# Patient Record
Sex: MALE | Race: WHITE | Employment: PART TIME | URBAN - METROPOLITAN AREA
[De-identification: names, ages, dates, MRNs, and addresses within clinical notes are randomized per-mention and may not be internally consistent; named-entity substitution may affect disease eponyms.]

---

## 2017-01-05 DIAGNOSIS — F90.2 ATTENTION DEFICIT HYPERACTIVITY DISORDER (ADHD), COMBINED TYPE: Primary | ICD-10-CM

## 2017-02-24 DIAGNOSIS — F90.2 ATTENTION DEFICIT HYPERACTIVITY DISORDER (ADHD), COMBINED TYPE: ICD-10-CM

## 2017-02-24 RX ORDER — DEXTROAMPHETAMINE SACCHARATE, AMPHETAMINE ASPARTATE MONOHYDRATE, DEXTROAMPHETAMINE SULFATE AND AMPHETAMINE SULFATE 7.5; 7.5; 7.5; 7.5 MG/1; MG/1; MG/1; MG/1
30 CAPSULE, EXTENDED RELEASE ORAL DAILY
Qty: 30 CAPSULE | Refills: 0 | Status: SHIPPED | OUTPATIENT
Start: 2017-02-24 | End: 2017-04-06

## 2017-02-24 RX ORDER — DEXTROAMPHETAMINE SULFATE 10 MG/1
10 TABLET ORAL DAILY
Qty: 30 TABLET | Refills: 0 | Status: SHIPPED | OUTPATIENT
Start: 2017-02-24 | End: 2017-04-06

## 2017-02-24 NOTE — TELEPHONE ENCOUNTER
amphetamine-dextroamphetamine (ADDERALL XR) 30 MG per 24 hr capsule      Last Written Prescription Date:  2/16/17   Last Fill Quantity: 30,  # refills: 0   Last Office Visit with [facility] or [facility] prescribing provider: 12/16/16                                     dextroamphetamine (DEXTROSTAT) 10 MG tablet      Last Written Prescription Date 02/16/17  Last Fill Quantity: 30,  # refills: 0   Last Office Visit with [facility], [facility] or [facility] prescribing provider: 12/16/16                                           MOM HAS APPOINTMENT AT 1 WITH YOU.  SHE WILL  THEN.

## 2017-02-24 NOTE — TELEPHONE ENCOUNTER
Will give mom Teressa Rx at her appt today, will have her sign to verify she picked up Rx.Bel Hernandez CMA

## 2017-04-06 DIAGNOSIS — F90.2 ATTENTION DEFICIT HYPERACTIVITY DISORDER (ADHD), COMBINED TYPE: ICD-10-CM

## 2017-04-06 RX ORDER — DEXTROAMPHETAMINE SULFATE 10 MG/1
10 TABLET ORAL DAILY
Qty: 30 TABLET | Refills: 0 | Status: SHIPPED | OUTPATIENT
Start: 2017-04-06 | End: 2017-05-02

## 2017-04-06 RX ORDER — DEXTROAMPHETAMINE SACCHARATE, AMPHETAMINE ASPARTATE MONOHYDRATE, DEXTROAMPHETAMINE SULFATE AND AMPHETAMINE SULFATE 7.5; 7.5; 7.5; 7.5 MG/1; MG/1; MG/1; MG/1
30 CAPSULE, EXTENDED RELEASE ORAL DAILY
Qty: 30 CAPSULE | Refills: 0 | Status: SHIPPED | OUTPATIENT
Start: 2017-04-06 | End: 2017-05-02

## 2017-04-06 NOTE — TELEPHONE ENCOUNTER
Adderall 10 mg  Please call patient at   [phone].  OK TO LM    Last Written Prescription Date:  02/24/17  Last Fill Quantity: 30,   # refills: 0  Last Office Visit with [facility] prescribing provider: 12/16/16  Future Office visit:    Next 5 appointments (look out 90 days)     Apr 12, 2017  3:20 PM CDT   SHORT with Christiana Harden MD   [facility]    [address] [place] [id number] [phone]                   Routing refill request to provider for review/approval because:  Drug not on the [facility], [facility] or [facility] refill protocol or controlled substance  [place] reviewed:  reviewed- no concerns    [place]:  [email or web address]  BACILIO Gleason RN

## 2017-04-07 NOTE — TELEPHONE ENCOUNTER
Patient called. He went to fill script and pharmacy told him ins is requiring prior auth.  Pharmacy is going to fax info today but patient wanted to give us a heads up that it was coming and if we can pls start PA today he would appreciate it.

## 2017-04-11 NOTE — TELEPHONE ENCOUNTER
PA approved and called pt and left msg to inform pt but unable to due to vmail not taking msg.  Notified pharmacy.Bel Hernandez, CMA

## 2017-05-02 DIAGNOSIS — F90.2 ATTENTION DEFICIT HYPERACTIVITY DISORDER (ADHD), COMBINED TYPE: ICD-10-CM

## 2017-05-02 RX ORDER — DEXTROAMPHETAMINE SACCHARATE, AMPHETAMINE ASPARTATE MONOHYDRATE, DEXTROAMPHETAMINE SULFATE AND AMPHETAMINE SULFATE 7.5; 7.5; 7.5; 7.5 MG/1; MG/1; MG/1; MG/1
30 CAPSULE, EXTENDED RELEASE ORAL DAILY
Qty: 30 CAPSULE | Refills: 0 | Status: SHIPPED | OUTPATIENT
Start: 2017-05-02 | End: 2017-05-31

## 2017-05-02 RX ORDER — DEXTROAMPHETAMINE SULFATE 10 MG/1
10 TABLET ORAL DAILY
Qty: 30 TABLET | Refills: 0 | Status: SHIPPED | OUTPATIENT
Start: 2017-05-02 | End: 2017-05-31

## 2017-05-02 NOTE — TELEPHONE ENCOUNTER
Dextrostat 10mg  Last Written Prescription Date:  4/6/17  Last Fill Quantity: 30,   # refills: 0  Last Office Visit with [facility] prescribing provider: 12/16/16  Future Office visit:       Routing refill request to provider for review/approval because:  Drug not on the [facility], [facility] or [facility] refill protocol or controlled substance    Adderall XR 30mg  Last Written Prescription Date:  4/6/17  Last Fill Quantity: 30,   # refills: 0  Last Office Visit with [facility] or [facility] prescribing provider: 12/16/16  Future Office visit:       Routing refill request to provider for review/approval because:  Drug not on the [facility], [facility] or [facility] refill protocol or controlled substance    Shara Colon,   [facility] [place]

## 2017-05-31 DIAGNOSIS — F90.2 ATTENTION DEFICIT HYPERACTIVITY DISORDER (ADHD), COMBINED TYPE: ICD-10-CM

## 2017-05-31 RX ORDER — DEXTROAMPHETAMINE SACCHARATE, AMPHETAMINE ASPARTATE MONOHYDRATE, DEXTROAMPHETAMINE SULFATE AND AMPHETAMINE SULFATE 7.5; 7.5; 7.5; 7.5 MG/1; MG/1; MG/1; MG/1
30 CAPSULE, EXTENDED RELEASE ORAL DAILY
Qty: 30 CAPSULE | Refills: 0 | Status: SHIPPED | OUTPATIENT
Start: 2017-06-02 | End: 2017-06-30

## 2017-05-31 RX ORDER — DEXTROAMPHETAMINE SULFATE 10 MG/1
10 TABLET ORAL DAILY
Qty: 30 TABLET | Refills: 0 | Status: SHIPPED | OUTPATIENT
Start: 2017-05-31 | End: 2017-05-31

## 2017-05-31 RX ORDER — DEXTROAMPHETAMINE SACCHARATE, AMPHETAMINE ASPARTATE MONOHYDRATE, DEXTROAMPHETAMINE SULFATE AND AMPHETAMINE SULFATE 7.5; 7.5; 7.5; 7.5 MG/1; MG/1; MG/1; MG/1
30 CAPSULE, EXTENDED RELEASE ORAL DAILY
Qty: 30 CAPSULE | Refills: 0 | Status: SHIPPED | OUTPATIENT
Start: 2017-05-31 | End: 2017-05-31

## 2017-05-31 RX ORDER — DEXTROAMPHETAMINE SULFATE 10 MG/1
10 TABLET ORAL DAILY
Qty: 30 TABLET | Refills: 0 | Status: SHIPPED | OUTPATIENT
Start: 2017-06-02 | End: 2017-08-16 | Stop reason: DRUGHIGH

## 2017-05-31 NOTE — TELEPHONE ENCOUNTER
Dextrostat      Last Written Prescription Date:  5/2/17  Last Fill Quantity: 30,   # refills: 0  Last Office Visit with [facility] provider: 12/16/17  Future Office visit:       Routing refill request to provider for review/approval because:  Drug not on the [facility] or [facility] refill protocol or controlled substance    Adderall      Last Written Prescription Date:  5/2/17  Last Fill Quantity: 30,   # refills: 0  Last Office Visit with [facility] prescribing provider: 12/16/17  Future Office visit:       Routing refill request to provider for review/approval because:  Drug not on the [facility] or [facility] refill protocol or controlled substance    Patient said he would like to pick this prescription up early for the next month so he does not have to make another trip.

## 2017-06-30 DIAGNOSIS — F90.2 ATTENTION DEFICIT HYPERACTIVITY DISORDER (ADHD), COMBINED TYPE: ICD-10-CM

## 2017-06-30 RX ORDER — DEXTROAMPHETAMINE SULFATE 10 MG/1
TABLET ORAL
Qty: 45 TABLET | Refills: 0 | Status: SHIPPED | OUTPATIENT
Start: 2017-06-30 | End: 2017-07-31

## 2017-06-30 RX ORDER — DEXTROAMPHETAMINE SACCHARATE, AMPHETAMINE ASPARTATE MONOHYDRATE, DEXTROAMPHETAMINE SULFATE AND AMPHETAMINE SULFATE 7.5; 7.5; 7.5; 7.5 MG/1; MG/1; MG/1; MG/1
30 CAPSULE, EXTENDED RELEASE ORAL DAILY
Qty: 30 CAPSULE | Refills: 0 | Status: SHIPPED | OUTPATIENT
Start: 2017-06-30 | End: 2017-08-11

## 2017-06-30 NOTE — TELEPHONE ENCOUNTER
Patient's mother calling that they have still not been able to find a primary and is requesting one more month. Please place at the . He is going out of town. Mother will be picking up if after 5 and patient if before 5. [phone]    Adderall XR 30 MG      Last Written Prescription Date:  6/2/17  Last Fill Quantity: 30,   # refills: 0  Last Office Visit with [facility] provider: 12/16/16  Future Office visit:       Routing refill request to provider for review/approval because:  Drug not on the [facility], [facility] or  [facility] refill protocol or controlled substance    Swathi Loco, RN      Dextrostat 10 MG       Last Written Prescription Date:  6/2/17  Last Fill Quantity: 30,   # refills: 0  Last Office Visit with [facility], [facility] prescribing provider: 12/16/16  Future Office visit:       Routing refill request to provider for review/approval because:  Drug not on the [facility], [facility] or  Health refill protocol or controlled substance    Swathi Loco, RN

## 2017-07-31 DIAGNOSIS — F90.2 ATTENTION DEFICIT HYPERACTIVITY DISORDER (ADHD), COMBINED TYPE: ICD-10-CM

## 2017-07-31 NOTE — TELEPHONE ENCOUNTER
Dextrostat 10 mg    -Patient will  the RX at the .   Call patient at [phone].  OK TO LM  Last Written Prescription Date:  06/30/17  Last Fill Quantity: 45,   # refills: 1  Last Office Visit with [facility] prescribing provider: 04/12/17  Future Office visit:    Next 5 appointments (look out 90 days)     Aug 10, 2017  7:20 AM CDT   SHORT with Christiana Harden MD   [facility]    [address] [place] [id number] [phone]                   Routing refill request to provider for review/approval because:  Drug not on the [facility], [facility] or [facility] refill protocol or controlled substance  Has been using one tablet for the past month and this has been working well.  Has appointment scheduled on 08/10/17.  BACILIO Gleason RN

## 2017-08-01 RX ORDER — DEXTROAMPHETAMINE SULFATE 10 MG/1
TABLET ORAL
Qty: 45 TABLET | Refills: 0 | Status: SHIPPED | OUTPATIENT
Start: 2017-08-01 | End: 2017-08-23

## 2017-08-11 DIAGNOSIS — F90.2 ATTENTION DEFICIT HYPERACTIVITY DISORDER (ADHD), COMBINED TYPE: ICD-10-CM

## 2017-08-11 RX ORDER — DEXTROAMPHETAMINE SACCHARATE, AMPHETAMINE ASPARTATE MONOHYDRATE, DEXTROAMPHETAMINE SULFATE AND AMPHETAMINE SULFATE 7.5; 7.5; 7.5; 7.5 MG/1; MG/1; MG/1; MG/1
30 CAPSULE, EXTENDED RELEASE ORAL DAILY
Qty: 30 CAPSULE | Refills: 0 | Status: SHIPPED | OUTPATIENT
Start: 2017-08-11 | End: 2017-08-23

## 2017-08-11 NOTE — TELEPHONE ENCOUNTER
Patient is completely out of medication, if possible please fill ASAP. Routing to a different provider as PCP is out of the office.  -Lesvia Pan

## 2017-08-11 NOTE — TELEPHONE ENCOUNTER
Patient no-showed appointment on 8/10. Rescheduled for 8/16. Rx printed. Needs to keep next appointment.  Jessenia Morgan MD  Internal Medicine/Pediatrics

## 2017-08-11 NOTE — TELEPHONE ENCOUNTER
Adderall XR 30mg  Last Written Prescription Date:  6/30/17  Last Fill Quantity: 30,   # refills: 0  Last Office Visit with [facility] prescribing provider: 12/16/16  Future Office visit:    Next 5 appointments (look out 90 days)     Aug 16, 2017  8:40 AM CDT   Office Visit with Christiana Harden MD   [facility]    [address] [place] [id number] [phone]                   Routing refill request to provider for review/approval because:  Drug not on the [facility], [facility] or [facility] refill protocol or controlled substance    Shara Colon,   [facility]

## 2017-08-11 NOTE — TELEPHONE ENCOUNTER
Called and informed patient that script ready for  and to make sure he does not miss next scheduled appointment.  Sylwia Mann LPN

## 2017-08-16 ENCOUNTER — OFFICE VISIT (OUTPATIENT)
Dept: PEDIATRICS | Facility: CLINIC | Age: 22
End: 2017-08-16
Payer: COMMERCIAL

## 2017-08-16 VITALS
HEART RATE: 90 BPM | BODY MASS INDEX: 36.79 KG/M2 | DIASTOLIC BLOOD PRESSURE: 78 MMHG | SYSTOLIC BLOOD PRESSURE: 124 MMHG | HEIGHT: 67 IN | OXYGEN SATURATION: 99 % | TEMPERATURE: 98.8 F | WEIGHT: 234.38 LBS

## 2017-08-16 DIAGNOSIS — F90.2 ATTENTION DEFICIT HYPERACTIVITY DISORDER (ADHD), COMBINED TYPE: Primary | ICD-10-CM

## 2017-08-16 DIAGNOSIS — R20.0 NUMBNESS OF LEFT HAND: ICD-10-CM

## 2017-08-16 PROCEDURE — 99214 OFFICE O/P EST MOD 30 MIN: CPT | Performed by: INTERNAL MEDICINE

## 2017-08-16 RX ORDER — DEXTROAMPHETAMINE SACCHARATE, AMPHETAMINE ASPARTATE MONOHYDRATE, DEXTROAMPHETAMINE SULFATE AND AMPHETAMINE SULFATE 7.5; 7.5; 7.5; 7.5 MG/1; MG/1; MG/1; MG/1
30 CAPSULE, EXTENDED RELEASE ORAL DAILY
Qty: 30 CAPSULE | Refills: 0 | Status: CANCELLED | OUTPATIENT
Start: 2017-08-16

## 2017-08-16 RX ORDER — DEXTROAMPHETAMINE SULFATE 10 MG/1
TABLET ORAL
Qty: 45 TABLET | Refills: 0 | Status: CANCELLED | OUTPATIENT
Start: 2017-08-16

## 2017-08-16 ASSESSMENT — ANXIETY QUESTIONNAIRES
IF YOU CHECKED OFF ANY PROBLEMS ON THIS QUESTIONNAIRE, HOW DIFFICULT HAVE THESE PROBLEMS MADE IT FOR YOU TO DO YOUR WORK, TAKE CARE OF THINGS AT HOME, OR GET ALONG WITH OTHER PEOPLE: NOT DIFFICULT AT ALL
1. FEELING NERVOUS, ANXIOUS, OR ON EDGE: NOT AT ALL
2. NOT BEING ABLE TO STOP OR CONTROL WORRYING: NOT AT ALL
7. FEELING AFRAID AS IF SOMETHING AWFUL MIGHT HAPPEN: NOT AT ALL
3. WORRYING TOO MUCH ABOUT DIFFERENT THINGS: NOT AT ALL
5. BEING SO RESTLESS THAT IT IS HARD TO SIT STILL: NOT AT ALL
6. BECOMING EASILY ANNOYED OR IRRITABLE: NOT AT ALL
GAD7 TOTAL SCORE: 0

## 2017-08-16 ASSESSMENT — PATIENT HEALTH QUESTIONNAIRE - PHQ9
SUM OF ALL RESPONSES TO PHQ QUESTIONS 1-9: 0
5. POOR APPETITE OR OVEREATING: NOT AT ALL

## 2017-08-16 NOTE — MR AVS SNAPSHOT
"              After Visit Summary   2017    Baldomero Donato    MRN: [id number]           Patient Information     Date Of Birth          1995        Visit Information        Provider Department      2017 8:40 AM Christiana Harden MD [facility]        Today's Diagnoses     Attention deficit hyperactivity disorder (ADHD), combined type    -  1    Numbness of left hand           Follow-ups after your visit        Who to contact     If you have questions or need follow up information about today's clinic visit or your schedule please contact [facility] directly at [phone].  Normal or non-critical lab and imaging results will be communicated to you by MyChart, letter or phone within 4 business days after the clinic has received the results. If you do not hear from us within 7 days, please contact the clinic through [employer] or phone. If you have a critical or abnormal lab result, we will notify you by phone as soon as possible.  Submit refill requests through [employer] or call your pharmacy and they will forward the refill request to us. Please allow 3 business days for your refill to be completed.          Additional Information About Your Visit        MyChart Information     [employer] lets you send messages to your doctor, view your test results, renew your prescriptions, schedule appointments and more. To sign up, go to [email or web address] . Click on \"Log in\" on the left side of the screen, which will take you to the Welcome page. Then click on \"Sign up Now\" on the right side of the page.     You will be asked to enter the access code listed below, as well as some personal information. Please follow the directions to create your username and password.     Your access code is: [email or web address]  Expires: 2017  7:48 AM     Your access code will  in 90 days. If you need help or a new code, please call your [facility] or [phone].        Care " "EveryWhere ID     This is your Care EveryWhere ID. This could be used by other organizations to access your [facility] medical records  [id number]        Your Vitals Were     Pulse Temperature Height Pulse Oximetry BMI (Body Mass Index)       90 98.8  F (37.1  C) (Tympanic) 5' 7.25\" (1.708 m) 99% 36.44 kg/m2        Blood Pressure from Last 3 Encounters:   08/16/17 124/78   12/14/16 114/80   11/29/16 130/80    Weight from Last 3 Encounters:   08/16/17 234 lb 6 oz (106.3 kg)   12/14/16 227 lb 6 oz (103.1 kg) (98 %)*   11/29/16 233 lb (105.7 kg) (98 %)*     * Growth percentiles are based on [facility] 2-20 Years data.              Today, you had the following     No orders found for display         Today's Medication Changes          These changes are accurate as of: 8/16/17  9:00 AM.  If you have any questions, ask your nurse or doctor.               These medicines have changed or have updated prescriptions.        Dose/Directions    dextroamphetamine 10 MG tablet   Commonly known as:  DEXTROSTAT   This may have changed:  Another medication with the same name was removed. Continue taking this medication, and follow the directions you see here.   Used for:  Attention deficit hyperactivity disorder (ADHD), combined type   Changed by:  Christiana Harden MD        1 to 1 1/2 tabs at 4 PM   Quantity:  45 tablet   Refills:  0                Primary Care Provider Office Phone # Fax #    Christiana Harden [phone] [address] [place] [address]        Equal Access to Services     [facility] AH: Hadii khang barcenas hadashsergio Sotimothy, waaxda luqadaha, qaybta kaalmada shellie chavez. So [facility] [phone].    ATENCIÓN: Si habla español, tiene a mane disposición servicios gratuitos de asistencia lingüística. Llame al [phone].    We comply with applicable federal civil rights laws and [place] laws. We do not discriminate on the basis of race, color, " national origin, age, disability sex, sexual orientation or gender identity.            Thank you!     Thank you for choosing [facility]  for your care. Our goal is always to provide you with excellent care. Hearing back from our patients is one way we can continue to improve our services. Please take a few minutes to complete the written survey that you may receive in the mail after your visit with us. Thank you!             Your Updated Medication List - Protect others around you: Learn how to safely use, store and throw away your medicines at www.disposemymeds.org.          This list is accurate as of: 8/16/17  9:00 AM.  Always use your most recent med list.                   Brand Name Dispense Instructions for use Diagnosis    albuterol 108 (90 BASE) MCG/ACT Inhaler    PROAIR HFA/PROVENTIL HFA/VENTOLIN HFA    1 Inhaler    Inhale 2 puffs into the lungs every 4 hours as needed for shortness of breath / dyspnea or wheezing    Acute bronchospasm       amphetamine-dextroamphetamine 30 MG per 24 hr capsule    ADDERALL XR    30 capsule    Take 1 capsule (30 mg) by mouth daily    Attention deficit hyperactivity disorder (ADHD), combined type       dextroamphetamine 10 MG tablet    DEXTROSTAT    45 tablet    1 to 1 1/2 tabs at 4 PM    Attention deficit hyperactivity disorder (ADHD), combined type

## 2017-08-16 NOTE — PROGRESS NOTES
"  SUBJECTIVE:                                                    Baldomero Donato is a 22 year old male who presents to clinic today for the following health issues:      Anxiety Follow-Up    Status since last visit: No change    Other associated symptoms:None    Complicating factors:   Significant life event: No   Current substance abuse: None  Depression symptoms: No  ROSY-7 SCORE 7/15/2016 12/16/2016   Total Score 1 0       GAD7        Amount of exercise or physical activity: work and pt stay active    Problems taking medications regularly: No    Medication side effects: headaches  Diet: regular (no restrictions)  Musculoskeletal problem/pain      Duration: sx started 4 days ago    Description  Location: left hand pain    Intensity:  mild    Accompanying signs and symptoms: radiation of pain to wrist, numbness, tingling and weakness of hand, throbbing pain    History  Previous similar problem: no   Previous evaluation:  none    Precipitating or alleviating factors:  Trauma or overuse: YES- pinched finger at work  Aggravating factors include: overuse    Therapies tried and outcome: massage and NSAID - ibuprofen      Left hand pain- started about 4 days ago when he pinched the tip of his middle finger on a bolt.  Now whole hand is painful into wrist.  Some numbness and tingling, some \"electric shock\" sensation down into forearm.  The tip of the middle finger is numb.  Does feel somewhat weak in the hand initially, seemed to improve after the first day.      ADHD- meds going well.  No difficulty with sleep.  Appetite is ok.  Headaches occasionally.        Problem list and histories reviewed & adjusted, as indicated.  Additional history: as documented    Patient Active Problem List   Diagnosis     Attention deficit hyperactivity disorder (ADHD)     History reviewed. No pertinent surgical history.    Social History   Substance Use Topics     Smoking status: Never Smoker     Smokeless tobacco: Current User     " "Types: Chew     Alcohol use No     Family History   Problem Relation Age of Onset     DIABETES No family hx of      C.A.D. No family hx of      Hypertension No family hx of          Current Outpatient Prescriptions   Medication Sig Dispense Refill     amphetamine-dextroamphetamine (ADDERALL XR) 30 MG per 24 hr capsule Take 1 capsule (30 mg) by mouth daily 30 capsule 0     dextroamphetamine (DEXTROSTAT) 10 MG tablet 1 to 1 1/2 tabs at 4 PM 45 tablet 0     albuterol (PROAIR HFA, PROVENTIL HFA, VENTOLIN HFA) 108 (90 BASE) MCG/ACT inhaler Inhale 2 puffs into the lungs every 4 hours as needed for shortness of breath / dyspnea or wheezing 1 Inhaler 0     Allergies   Allergen Reactions     Bees          Reviewed and updated as needed this visit by clinical staffTobacco  Allergies  Meds  Med Hx  Surg Hx  Fam Hx  Soc Hx      Reviewed and updated as needed this visit by Provider         ROS:  Constitutional, HEENT, cardiovascular, pulmonary, gi and gu systems are negative, except as otherwise noted.      OBJECTIVE:   /78 (BP Location: Right arm, Patient Position: Chair, Cuff Size: Adult Large)  Pulse 90  Temp 98.8  F (37.1  C) (Tympanic)  Ht 5' 7.25\" (1.708 m)  Wt 234 lb 6 oz (106.3 kg)  SpO2 99%  BMI 36.44 kg/m2  Body mass index is 36.44 kg/(m^2).  GENERAL: healthy, alert and no distress  EYES: Eyes grossly normal to inspection, PERRL and conjunctivae and sclerae normal  HENT: ear canals and TM's normal, nose and mouth without ulcers or lesions  NECK: no adenopathy, no asymmetry, masses, or scars and thyroid normal to palpation  RESP: lungs clear to auscultation - no rales, rhonchi or wheezes  CV: regular rate and rhythm, normal S1 S2, no S3 or S4, no murmur, click or rub, no peripheral edema and peripheral pulses strong  MS: no gross musculoskeletal defects noted, no edema.  + tinnels on left.   strength normal, normal strength of middle finger  PSYCH: mentation appears normal, affect " normal/bright    Diagnostic Test Results:  none     ASSESSMENT/PLAN:     (F90.2) Attention deficit hyperactivity disorder (ADHD), combined type  (primary encounter diagnosis)  -doing well on current adderall xr 30mg with short acting dextroamphetamine 10-15mg mid afternoon prn  -bp initially elevated in clinic but recheck more normal  -no side effects from medications noted  -recheck in 6 months     (R20.8) Numbness of left hand  -suspect patient irritated nerve with trying to twist off bolt  -will give wrist splint for nighttime  -if not improved in next 2-3 weeks would have him see ortho   Plan: order for DME              FUTURE APPOINTMENTS:       - Follow-up visit in 6 months    Christiana Harden MD  [facility]

## 2017-08-16 NOTE — NURSING NOTE
"Chief Complaint   Patient presents with     A.D.H.D     follow and med review     Musculoskeletal Problem     left hand pain,tingling       Initial /90 (BP Location: Right arm, Patient Position: Chair, Cuff Size: Adult Large)  Pulse 90  Temp 98.8  F (37.1  C) (Tympanic)  Ht 5' 7.25\" (1.708 m)  Wt 234 lb 6 oz (106.3 kg)  SpO2 99%  BMI 36.44 kg/m2 Estimated body mass index is 36.44 kg/(m^2) as calculated from the following:    Height as of this encounter: 5' 7.25\" (1.708 m).    Weight as of this encounter: 234 lb 6 oz (106.3 kg).  Medication Reconciliation: complete   Bel Hernandez CMA    "

## 2017-08-17 ASSESSMENT — ANXIETY QUESTIONNAIRES: GAD7 TOTAL SCORE: 0

## 2017-08-23 DIAGNOSIS — F90.2 ATTENTION DEFICIT HYPERACTIVITY DISORDER (ADHD), COMBINED TYPE: ICD-10-CM

## 2017-08-23 RX ORDER — DEXTROAMPHETAMINE SULFATE 10 MG/1
TABLET ORAL
Qty: 45 TABLET | Refills: 0 | Status: SHIPPED | OUTPATIENT
Start: 2017-10-01 | End: 2018-01-17

## 2017-08-23 RX ORDER — DEXTROAMPHETAMINE SACCHARATE, AMPHETAMINE ASPARTATE MONOHYDRATE, DEXTROAMPHETAMINE SULFATE AND AMPHETAMINE SULFATE 7.5; 7.5; 7.5; 7.5 MG/1; MG/1; MG/1; MG/1
30 CAPSULE, EXTENDED RELEASE ORAL DAILY
Qty: 30 CAPSULE | Refills: 0 | Status: SHIPPED | OUTPATIENT
Start: 2017-10-01 | End: 2017-08-25

## 2017-08-23 RX ORDER — DEXTROAMPHETAMINE SULFATE 10 MG/1
TABLET ORAL
Qty: 45 TABLET | Refills: 0 | Status: SHIPPED | OUTPATIENT
Start: 2017-09-01 | End: 2017-08-23

## 2017-08-23 RX ORDER — DEXTROAMPHETAMINE SACCHARATE, AMPHETAMINE ASPARTATE MONOHYDRATE, DEXTROAMPHETAMINE SULFATE AND AMPHETAMINE SULFATE 7.5; 7.5; 7.5; 7.5 MG/1; MG/1; MG/1; MG/1
30 CAPSULE, EXTENDED RELEASE ORAL DAILY
Qty: 30 CAPSULE | Refills: 0 | Status: SHIPPED | OUTPATIENT
Start: 2017-09-01 | End: 2017-08-23

## 2017-08-23 NOTE — TELEPHONE ENCOUNTER
Patient's mother calling that patient was supposed to get future scripts for Adderall and for Dextrostat when he was seen because he is going out of town and will have to fill while there. Wondering if he could get for multiple months at a time. Please leave at the .  [phone] ok to lm      checked and Adderall filled 8/11 and Dextrostat filled 8/1    Swathi Loco RN

## 2017-08-24 ENCOUNTER — TELEPHONE (OUTPATIENT)
Dept: NURSING | Facility: CLINIC | Age: 22
End: 2017-08-24

## 2017-08-24 ENCOUNTER — NURSE TRIAGE (OUTPATIENT)
Dept: NURSING | Facility: CLINIC | Age: 22
End: 2017-08-24

## 2017-08-24 DIAGNOSIS — F90.2 ATTENTION DEFICIT HYPERACTIVITY DISORDER (ADHD), COMBINED TYPE: ICD-10-CM

## 2017-08-24 NOTE — TELEPHONE ENCOUNTER
Clinic Action Needed:Yes, Please call patient [phone]    Reason for Call:Patient reporting he picked up Adderall prescriptions yesterday and was missing the one for September.    Lesvia Padilla RN  [facility]

## 2017-08-24 NOTE — TELEPHONE ENCOUNTER
Clinic Action Needed:Yes, Please call patient [phone]    Reason for Call:Patient reporting he picked up Adderall prescriptions yesterday and was missing the September one.    Lesvia Padilla RN  [facility]

## 2017-08-24 NOTE — TELEPHONE ENCOUNTER
I spoke with SHANE Staples, and she put 4 RXs in the envelope for patient to  .  LMTCB.     Has he checked the dates on the RXs he has?  BACILIO Gleason RN

## 2017-08-24 NOTE — TELEPHONE ENCOUNTER
Patient is calling back -patient states he received three Dextrostat RX s-one for August, Sept and Oct.  Only received one Adderall XR in the envelope dated for 10/01.  Patient will bring scripts to the clinic tomorrow for Dr. Harden's review.  Patient doesn't need RX prior to Tuesday.  [facility] notified that patient will drop off the RXs tomorrow.  BACILIO Gleason RN

## 2017-08-25 RX ORDER — DEXTROAMPHETAMINE SACCHARATE, AMPHETAMINE ASPARTATE MONOHYDRATE, DEXTROAMPHETAMINE SULFATE AND AMPHETAMINE SULFATE 7.5; 7.5; 7.5; 7.5 MG/1; MG/1; MG/1; MG/1
30 CAPSULE, EXTENDED RELEASE ORAL DAILY
Qty: 30 CAPSULE | Refills: 0 | Status: SHIPPED | OUTPATIENT
Start: 2017-09-01 | End: 2018-01-17

## 2017-08-25 NOTE — TELEPHONE ENCOUNTER
He needs to keep the 2 dextrostat scripts dated sept and oct as well as the adderal script dated October.  I have printed an adderall script for September.   Christiana Harden MD

## 2017-10-16 DIAGNOSIS — F90.2 ATTENTION DEFICIT HYPERACTIVITY DISORDER (ADHD), COMBINED TYPE: ICD-10-CM

## 2017-10-16 RX ORDER — DEXTROAMPHETAMINE SACCHARATE, AMPHETAMINE ASPARTATE MONOHYDRATE, DEXTROAMPHETAMINE SULFATE AND AMPHETAMINE SULFATE 7.5; 7.5; 7.5; 7.5 MG/1; MG/1; MG/1; MG/1
30 CAPSULE, EXTENDED RELEASE ORAL DAILY
Qty: 30 CAPSULE | Refills: 0 | Status: CANCELLED | OUTPATIENT
Start: 2017-10-16

## 2017-10-16 RX ORDER — DEXTROAMPHETAMINE SULFATE 10 MG/1
TABLET ORAL
Qty: 45 TABLET | Refills: 0 | Status: CANCELLED | OUTPATIENT
Start: 2017-10-16

## 2017-10-16 NOTE — TELEPHONE ENCOUNTER
He should not be due for next script until 11/1 per our records. Did he lose some of his scripts?    Reviewed  and had 30 mg prescription filled on 10/4 and 10 mg prescription filled on 9/23.     Jessenia Morgan MD  Internal Medicine/Pediatrics

## 2017-10-16 NOTE — TELEPHONE ENCOUNTER
Patient calling requesting refills for Adderall and dextroamphetamine. He would like to pick scripts up at the .    He said that he usually gets a 3 month supply.  Routing to Dr Morgan since patient would like to pick this up today. Patient did say if it can't be filled today he is ok waiting until tomorrow when Dr Harden is back in the clinic.    Adderall XR 30mg      Last Written Prescription Date:  9/1/17  Last Fill Quantity: 30,   # refills: 0  [facility] [place] [facility] prescribing provider: 8/16/17  Future Office visit:       Routing refill request to provider for review/approval because:  Drug not on the [facility], [facility] or [facility] refill protocol or controlled substance    dextroamphetamine      Last Written Prescription Date:  10/1/17  Last Fill Quantity: 45,   # refills: 0  Last Office Visit with [facility] prescribing provder: 8/16/17  Future Office visit:       Routing refill request to provider for review/approval because:  Drug not on the [facility], [facility] or [facility] refill protocol or controlled substance

## 2017-10-20 NOTE — TELEPHONE ENCOUNTER
Patient calls.  Advised that it is not due until 11/1.  Patient will call later and allow 72 hours for a refill.  He verbalized understanding.    Lisa Perez RN  Message handled by Nurse Triage.

## 2017-10-20 NOTE — TELEPHONE ENCOUNTER
Dr. Harden - Are you ok continuing to fill 3 - 30 day supply of Adderall XR 30 mg & Dextrostat 10 mg? If so, ok to route back to me. Though the doses are filled about 10 days apart, we can change the start dates. I will postpone this encounter to closer to the refill due date of 10/29. Mom will help patient keep track of all rx's.     OVERALL, patient is NOT due for any refills at this time per [place]  review.   I spoke with mom with patient's permission. We found that patient didn't  1 - Adderall XR 30 mg rx from the 8/23/17 encounter, which I shredded. He likely lost 1 or 2 of the rx's from that 8/23 encounter as well.  He is due for Dextrostat 10 mg closer to 10/29 and Adderall XR 30 mg closer to 11/9. Mom & patient aware.   I reviewed that we are not responsible for lost or stolen medication/rx's nor responsible if the patient is taking differently than prescribed. I reviewed fill dates & next due dates with mom.   Mom & patient are inquiring if Dr. Harden would continue to fill 3 - 30 day rx's for both doses.     Call back mom on [phone].

## 2017-10-20 NOTE — TELEPHONE ENCOUNTER
I'm fine with changing the start dates so that they are at the same time.      He has to be seen every 6 months.  I know they have changed insurance, and so I will fill but only until February, and then they have to find a new provider or see me again.  That means that I will do oct, nov, and December and I would be ok doing 3 prescriptions, but if he loses them I will not write them again.    Christiana Harden MD

## 2017-10-20 NOTE — TELEPHONE ENCOUNTER
Updated patient, he agrees with plan. Postponed this encounter for closer to 10/29. Will have both doses start dates at the same time.

## 2017-10-27 RX ORDER — DEXTROAMPHETAMINE SACCHARATE, AMPHETAMINE ASPARTATE MONOHYDRATE, DEXTROAMPHETAMINE SULFATE AND AMPHETAMINE SULFATE 7.5; 7.5; 7.5; 7.5 MG/1; MG/1; MG/1; MG/1
30 CAPSULE, EXTENDED RELEASE ORAL DAILY
Qty: 30 CAPSULE | Refills: 0 | Status: SHIPPED | OUTPATIENT
Start: 2017-10-27 | End: 2017-11-26

## 2017-10-27 RX ORDER — DEXTROAMPHETAMINE SULFATE 10 MG/1
TABLET ORAL
Qty: 45 TABLET | Refills: 0 | Status: SHIPPED | OUTPATIENT
Start: 2017-12-27 | End: 2018-01-26

## 2017-10-27 RX ORDER — DEXTROAMPHETAMINE SULFATE 10 MG/1
TABLET ORAL
Qty: 45 TABLET | Refills: 0 | Status: SHIPPED | OUTPATIENT
Start: 2017-10-27 | End: 2017-11-25

## 2017-10-27 RX ORDER — DEXTROAMPHETAMINE SULFATE 10 MG/1
TABLET ORAL
Qty: 45 TABLET | Refills: 0 | Status: SHIPPED | OUTPATIENT
Start: 2017-11-26 | End: 2017-12-26

## 2017-10-27 RX ORDER — DEXTROAMPHETAMINE SACCHARATE, AMPHETAMINE ASPARTATE MONOHYDRATE, DEXTROAMPHETAMINE SULFATE AND AMPHETAMINE SULFATE 7.5; 7.5; 7.5; 7.5 MG/1; MG/1; MG/1; MG/1
30 CAPSULE, EXTENDED RELEASE ORAL DAILY
Qty: 30 CAPSULE | Refills: 0 | Status: SHIPPED | OUTPATIENT
Start: 2017-11-26 | End: 2017-12-26

## 2017-10-27 RX ORDER — DEXTROAMPHETAMINE SACCHARATE, AMPHETAMINE ASPARTATE MONOHYDRATE, DEXTROAMPHETAMINE SULFATE AND AMPHETAMINE SULFATE 7.5; 7.5; 7.5; 7.5 MG/1; MG/1; MG/1; MG/1
30 CAPSULE, EXTENDED RELEASE ORAL DAILY
Qty: 30 CAPSULE | Refills: 0 | Status: SHIPPED | OUTPATIENT
Start: 2017-12-27 | End: 2018-01-26

## 2018-01-17 DIAGNOSIS — F90.2 ATTENTION DEFICIT HYPERACTIVITY DISORDER (ADHD), COMBINED TYPE: ICD-10-CM

## 2018-01-17 RX ORDER — DEXTROAMPHETAMINE SULFATE 10 MG/1
TABLET ORAL
Qty: 45 TABLET | Refills: 0 | Status: SHIPPED | OUTPATIENT
Start: 2018-01-17 | End: 2018-02-21

## 2018-01-17 RX ORDER — DEXTROAMPHETAMINE SACCHARATE, AMPHETAMINE ASPARTATE MONOHYDRATE, DEXTROAMPHETAMINE SULFATE AND AMPHETAMINE SULFATE 7.5; 7.5; 7.5; 7.5 MG/1; MG/1; MG/1; MG/1
30 CAPSULE, EXTENDED RELEASE ORAL DAILY
Qty: 30 CAPSULE | Refills: 0 | Status: SHIPPED | OUTPATIENT
Start: 2018-01-17 | End: 2018-07-31

## 2018-01-17 NOTE — TELEPHONE ENCOUNTER
Dextrostat      Last Written Prescription Date:  10/27/17  Last Fill Quantity: 45,   # refills: 0  Last Office Visit: 8/16/17  Future Office visit:       Routing refill request to provider for review/approval because:  Drug not on the [facility], [facility] or [facility] refill protocol or controlled substance    Adderall      Last Written Prescription Date:  10/27/17  Last Fill Quantity: 30,   # refills: 0  Last Office Visit: 8/16/17  Future Office visit:       Routing refill request to provider for review/approval because:  Drug not on the [facility], [facility] or [facility] refill protocol or controlled substance    Patient would like 3 month supply of both medications.

## 2018-01-17 NOTE — TELEPHONE ENCOUNTER
I will not do 3 month supply as patient is due for a visit.  He was informed at his last refill in October (3 month supply) that I would not continue to fill past February without an appointment.  I know they have changed insurances and need to get established with a new, covered provider.  Scripts in my outbasket for a 1 month supply.    Christiana Harden MD

## 2018-01-17 NOTE — TELEPHONE ENCOUNTER
"Scripts brought to .  Sylwia Mann LPN  Patient \"no-showed\" appointment today (1/18). Called and LM that scripts at  to be picked up but no further prescriptions will be given without and OV, even if he runs out of meds.  Sylwia Mann LPN    "

## 2018-02-16 DIAGNOSIS — F90.2 ATTENTION DEFICIT HYPERACTIVITY DISORDER (ADHD), COMBINED TYPE: ICD-10-CM

## 2018-02-16 RX ORDER — DEXTROAMPHETAMINE SULFATE 10 MG/1
TABLET ORAL
Qty: 45 TABLET | Refills: 0 | Status: CANCELLED | OUTPATIENT
Start: 2018-02-16

## 2018-02-16 RX ORDER — DEXTROAMPHETAMINE SACCHARATE, AMPHETAMINE ASPARTATE MONOHYDRATE, DEXTROAMPHETAMINE SULFATE AND AMPHETAMINE SULFATE 7.5; 7.5; 7.5; 7.5 MG/1; MG/1; MG/1; MG/1
30 CAPSULE, EXTENDED RELEASE ORAL DAILY
Qty: 30 CAPSULE | Refills: 0 | Status: CANCELLED | OUTPATIENT
Start: 2018-02-16

## 2018-02-16 NOTE — TELEPHONE ENCOUNTER
No, i'm sorry.  They were aware of his need to establish care with [facility] at his last visit 6 months ago, I was clear that I would not refill past that time without an appointment. They were reminded of this in October and again in January when I gave them a 1 month supply to get them to when he can be seen with a new provider.   He has an appointment with me scheduled 2/21, is he not coming?    Christiana Harden MD

## 2018-02-16 NOTE — TELEPHONE ENCOUNTER
Mother calling. Patient has just gotten new insurance and must be seen by [facility] now but needs a refill until he can get established there. Requesting one last month. Call mother when ready because patient can't answer phone at his work but will  the paper copy. Needs today.  [phone]    Controlled Substance Refill Request for Adderall XR 30 MG  Problem List Complete:  Yes    Last Written Prescription Date:  1/17/18  Last Fill Quantity: 30,   # refills: 0    Last Office Visit with [facility] primary care provider: 8/16/17    Clinic visit frequency required: Q 6  months     Future Office visit:   Next 5 appointments (look out 90 days)     Feb 21, 2018  1:20 PM CST   Office Visit with Christiana Harden MD   [facility] ([facility]    [address] [place] [id number] [phone]                  Controlled substance agreement on file: No.     Processing:  Patient will  in clinic   checked in past 6 months?  Yes 2/16/18       Controlled Substance Refill Request for Dextrostat 10 MG  Problem List Complete:  Yes   checked in past 6 months?  Yes 2/16/18

## 2018-02-19 NOTE — TELEPHONE ENCOUNTER
Mom called back and insurance will cover OV. Patient will plan to follow up.     Mom reports he is out of medication. Are you ok to supply a refill?    Rohini MARTIN RN, BSN, PHN  [facility] [employer] RN

## 2018-02-19 NOTE — TELEPHONE ENCOUNTER
Not until he keeps an appointment.  He was a no show to his last appointment.   Chrsitiana Harden MD

## 2018-02-19 NOTE — TELEPHONE ENCOUNTER
Called and spoke with patient. He is okay to wait until appointment for new script. He will let mom know that this is the plan.  Sylwia Mann LPN

## 2018-02-21 ENCOUNTER — OFFICE VISIT (OUTPATIENT)
Dept: PEDIATRICS | Facility: CLINIC | Age: 23
End: 2018-02-21
Payer: COMMERCIAL

## 2018-02-21 ENCOUNTER — TELEPHONE (OUTPATIENT)
Dept: PEDIATRICS | Facility: CLINIC | Age: 23
End: 2018-02-21

## 2018-02-21 VITALS
TEMPERATURE: 97.8 F | DIASTOLIC BLOOD PRESSURE: 72 MMHG | HEART RATE: 60 BPM | SYSTOLIC BLOOD PRESSURE: 118 MMHG | OXYGEN SATURATION: 96 % | BODY MASS INDEX: 34.77 KG/M2 | WEIGHT: 229.4 LBS | HEIGHT: 68 IN

## 2018-02-21 DIAGNOSIS — F90.2 ATTENTION DEFICIT HYPERACTIVITY DISORDER (ADHD), COMBINED TYPE: ICD-10-CM

## 2018-02-21 PROCEDURE — 99214 OFFICE O/P EST MOD 30 MIN: CPT | Performed by: INTERNAL MEDICINE

## 2018-02-21 RX ORDER — DEXTROAMPHETAMINE SULFATE 10 MG/1
TABLET ORAL
Qty: 45 TABLET | Refills: 0 | Status: SHIPPED | OUTPATIENT
Start: 2018-04-21 | End: 2018-04-30

## 2018-02-21 RX ORDER — DEXTROAMPHETAMINE SACCHARATE, AMPHETAMINE ASPARTATE MONOHYDRATE, DEXTROAMPHETAMINE SULFATE AND AMPHETAMINE SULFATE 7.5; 7.5; 7.5; 7.5 MG/1; MG/1; MG/1; MG/1
30 CAPSULE, EXTENDED RELEASE ORAL DAILY
Qty: 30 CAPSULE | Refills: 0 | Status: SHIPPED | OUTPATIENT
Start: 2018-03-24 | End: 2018-04-23

## 2018-02-21 RX ORDER — DEXTROAMPHETAMINE SACCHARATE, AMPHETAMINE ASPARTATE MONOHYDRATE, DEXTROAMPHETAMINE SULFATE AND AMPHETAMINE SULFATE 7.5; 7.5; 7.5; 7.5 MG/1; MG/1; MG/1; MG/1
30 CAPSULE, EXTENDED RELEASE ORAL DAILY
Qty: 30 CAPSULE | Refills: 0 | Status: SHIPPED | OUTPATIENT
Start: 2018-02-21 | End: 2018-03-23

## 2018-02-21 RX ORDER — DEXTROAMPHETAMINE SACCHARATE, AMPHETAMINE ASPARTATE MONOHYDRATE, DEXTROAMPHETAMINE SULFATE AND AMPHETAMINE SULFATE 7.5; 7.5; 7.5; 7.5 MG/1; MG/1; MG/1; MG/1
30 CAPSULE, EXTENDED RELEASE ORAL DAILY
Qty: 30 CAPSULE | Refills: 0 | Status: SHIPPED | OUTPATIENT
Start: 2018-04-24 | End: 2018-05-24

## 2018-02-21 RX ORDER — DEXTROAMPHETAMINE SULFATE 10 MG/1
TABLET ORAL
Qty: 45 TABLET | Refills: 0 | Status: SHIPPED | OUTPATIENT
Start: 2018-02-21 | End: 2018-02-21

## 2018-02-21 RX ORDER — DEXTROAMPHETAMINE SULFATE 10 MG/1
TABLET ORAL
Qty: 45 TABLET | Refills: 0 | Status: SHIPPED | OUTPATIENT
Start: 2018-03-21 | End: 2018-02-21

## 2018-02-21 NOTE — NURSING NOTE
"Chief Complaint   Patient presents with     Recheck Medication       Initial /72 (BP Location: Right arm, Patient Position: Chair, Cuff Size: Adult Regular)  Pulse 60  Temp 97.8  F (36.6  C) (Oral)  Ht 5' 7.5\" (1.715 m)  Wt 229 lb 6.4 oz (104.1 kg)  SpO2 96%  BMI 35.4 kg/m2 Estimated body mass index is 35.4 kg/(m^2) as calculated from the following:    Height as of this encounter: 5' 7.5\" (1.715 m).    Weight as of this encounter: 229 lb 6.4 oz (104.1 kg).  Medication Reconciliation: complete  "

## 2018-02-21 NOTE — TELEPHONE ENCOUNTER
Mother calling stating that patient needs a PA for the Adderall.  Patient has been out of medication for 4 days.  I asked her to have the pharmacy fax this information to the station B fax  Advised mother that this will then be submitted to the PA team to process.  BACILIO Gleason RN    Prior Authorization Retail Medication Request  Medication/Dose: Adderall R 30 mg daily  Diagnosis and ICD code: ADHD/F90.2  New/Renewal/Insurance Change PA: New insurance  Previously Tried and Failed Therapies:April 2017-Dextrostat, 2014-Vyvanse    Insurance ID (if provided): [id number]  Insurance Phone (if provided): [phone]    Any additional info from fax request:     If you received a fax notification from an outside Pharmacy:  Pharmacy Name:[facility] pharmacy  Pharmacy #:[phone]  Pharmacy Fax:[phone]  BACILIO Gleason RN

## 2018-02-21 NOTE — MR AVS SNAPSHOT
"              After Visit Summary   2018    Baldomero Donato    MRN: [id number]           Patient Information     Date Of Birth          1995        Visit Information        Provider Department      2018 1:20 PM Christiana Harden MD [facility]        Today's Diagnoses     Attention deficit hyperactivity disorder (ADHD), combined type           Follow-ups after your visit        Who to contact     If you have questions or need follow up information about today's clinic visit or your schedule please contact [facility] directly at [phone].  Normal or non-critical lab and imaging results will be communicated to you by myVBOhart, letter or phone within 4 business days after the clinic has received the results. If you do not hear from us within 7 days, please contact the clinic through myVBOhart or phone. If you have a critical or abnormal lab result, we will notify you by phone as soon as possible.  Submit refill requests through Alektrona or call your pharmacy and they will forward the refill request to us. Please allow 3 business days for your refill to be completed.          Additional Information About Your Visit        MyChart Information     Alektrona lets you send messages to your doctor, view your test results, renew your prescriptions, schedule appointments and more. To sign up, go to [email or web address] . Click on \"Log in\" on the left side of the screen, which will take you to the Welcome page. Then click on \"Sign up Now\" on the right side of the page.     You will be asked to enter the access code listed below, as well as some personal information. Please follow the directions to create your username and password.     Your access code is: [email or web address]  Expires: 2018 11:09 AM     Your access code will  in 90 days. If you need help or a new code, please call your [facility] or [phone].        Care EveryWhere ID     This is your Care " "EveryWhere ID. This could be used by other organizations to access your [facility] medical records  [id number]        Your Vitals Were     Pulse Temperature Height Pulse Oximetry BMI (Body Mass Index)       60 97.8  F (36.6  C) (Oral) 5' 7.5\" (1.715 m) 96% 35.4 kg/m2        Blood Pressure from Last 3 Encounters:   02/21/18 118/72   08/16/17 124/78   12/14/16 114/80    Weight from Last 3 Encounters:   02/21/18 229 lb 6.4 oz (104.1 kg)   08/16/17 234 lb 6 oz (106.3 kg)   12/14/16 227 lb 6 oz (103.1 kg) (98 %)*     * Growth percentiles are based on [facility] 2-20 Years data.              Today, you had the following     No orders found for display         Today's Medication Changes          These changes are accurate as of 2/21/18  1:48 PM.  If you have any questions, ask your nurse or doctor.               Start taking these medicines.        Dose/Directions    dextroamphetamine 10 MG tablet   Commonly known as:  DEXTROSTAT   Used for:  Attention deficit hyperactivity disorder (ADHD), combined type   Started by:  Christiana Harden MD        Start taking on:  4/21/2018   1 to 1 1/2 tabs at 4 PM   Quantity:  45 tablet   Refills:  0         These medicines have changed or have updated prescriptions.        Dose/Directions    * amphetamine-dextroamphetamine 30 MG per 24 hr capsule   Commonly known as:  ADDERALL XR   This may have changed:  Another medication with the same name was added. Make sure you understand how and when to take each.   Used for:  Attention deficit hyperactivity disorder (ADHD), combined type   Changed by:  Christiana Harden MD        Dose:  30 mg   Take 1 capsule (30 mg) by mouth daily   Quantity:  30 capsule   Refills:  0       * amphetamine-dextroamphetamine 30 MG per 24 hr capsule   Commonly known as:  ADDERALL XR   This may have changed:  You were already taking a medication with the same name, and this prescription was added. Make sure you understand how and when to take each.   Used " for:  Attention deficit hyperactivity disorder (ADHD), combined type   Changed by:  Christiana Harden MD        Dose:  30 mg   Take 1 capsule (30 mg) by mouth daily   Quantity:  30 capsule   Refills:  0       * amphetamine-dextroamphetamine 30 MG per 24 hr capsule   Commonly known as:  ADDERALL XR   This may have changed:  You were already taking a medication with the same name, and this prescription was added. Make sure you understand how and when to take each.   Used for:  Attention deficit hyperactivity disorder (ADHD), combined type   Changed by:  Christiana Harden MD        Dose:  30 mg   Start taking on:  3/24/2018   Take 1 capsule (30 mg) by mouth daily   Quantity:  30 capsule   Refills:  0       * amphetamine-dextroamphetamine 30 MG per 24 hr capsule   Commonly known as:  ADDERALL XR   This may have changed:  You were already taking a medication with the same name, and this prescription was added. Make sure you understand how and when to take each.   Used for:  Attention deficit hyperactivity disorder (ADHD), combined type   Changed by:  Christiana Harden MD        Dose:  30 mg   Start taking on:  4/24/2018   Take 1 capsule (30 mg) by mouth daily   Quantity:  30 capsule   Refills:  0       * Notice:  This list has 4 medication(s) that are the same as other medications prescribed for you. Read the directions carefully, and ask your doctor or other care provider to review them with you.         Where to get your medicines      Some of these will need a paper prescription and others can be bought over the counter.  Ask your nurse if you have questions.     Bring a paper prescription for each of these medications     amphetamine-dextroamphetamine 30 MG per 24 hr capsule    amphetamine-dextroamphetamine 30 MG per 24 hr capsule    amphetamine-dextroamphetamine 30 MG per 24 hr capsule    dextroamphetamine 10 MG tablet                Primary Care Provider Office Phone # Fax #    Christiana  Amaris Harden [phone]       [address] [place] [address]        Equal Access to Services     KIMBER REAL : Hadii aad ku hadchristophersergio Daniel, wamonicada tacosolgaha, farhadta charipepperinocencia gillloretta, shellie luis min hayaan bridgetalonso membreno latristangenia huddleston. So [facility] [phone].    ATENCIÓN: Si habla español, tiene a mane disposición servicios gratuitos de asistencia lingüística. Llame al [phone].    We comply with applicable federal civil rights laws and [place] laws. We do not discriminate on the basis of race, color, national origin, age, disability, sex, sexual orientation, or gender identity.            Thank you!     Thank you for choosing [facility]  for your care. Our goal is always to provide you with excellent care. Hearing back from our patients is one way we can continue to improve our services. Please take a few minutes to complete the written survey that you may receive in the mail after your visit with us. Thank you!             Your Updated Medication List - Protect others around you: Learn how to safely use, store and throw away your medicines at www.disposemymeds.org.          This list is accurate as of 2/21/18  1:48 PM.  Always use your most recent med list.                   Brand Name Dispense Instructions for use Diagnosis    albuterol 108 (90 BASE) MCG/ACT Inhaler    PROAIR HFA/PROVENTIL HFA/VENTOLIN HFA    1 Inhaler    Inhale 2 puffs into the lungs every 4 hours as needed for shortness of breath / dyspnea or wheezing    Acute bronchospasm       * amphetamine-dextroamphetamine 30 MG per 24 hr capsule    ADDERALL XR    30 capsule    Take 1 capsule (30 mg) by mouth daily    Attention deficit hyperactivity disorder (ADHD), combined type       * amphetamine-dextroamphetamine 30 MG per 24 hr capsule    ADDERALL XR    30 capsule    Take 1 capsule (30 mg) by mouth daily    Attention deficit hyperactivity disorder (ADHD), combined type       * amphetamine-dextroamphetamine 30  MG per 24 hr capsule   Start taking on:  3/24/2018    ADDERALL XR    30 capsule    Take 1 capsule (30 mg) by mouth daily    Attention deficit hyperactivity disorder (ADHD), combined type       * amphetamine-dextroamphetamine 30 MG per 24 hr capsule   Start taking on:  4/24/2018    ADDERALL XR    30 capsule    Take 1 capsule (30 mg) by mouth daily    Attention deficit hyperactivity disorder (ADHD), combined type       dextroamphetamine 10 MG tablet   Start taking on:  4/21/2018    DEXTROSTAT    45 tablet    1 to 1 1/2 tabs at 4 PM    Attention deficit hyperactivity disorder (ADHD), combined type       order for DME     1 Units    Equipment being ordered: wrist brace    Numbness of left hand       * Notice:  This list has 4 medication(s) that are the same as other medications prescribed for you. Read the directions carefully, and ask your doctor or other care provider to review them with you.

## 2018-02-21 NOTE — PROGRESS NOTES
SUBJECTIVE:   Baldomero Donato is a 22 year old male who presents to clinic today for the following health issues:    Medication Followup of Adderall XR 30mg  And Dextrostat 10 mg    Taking Medication as prescribed: yes    Side Effects:  None    Medication Helping Symptoms:  yes     Patient prefers written scripts that he can take to [place], [place] [address]. Can't find/pull up this pharmacy in our system.    Patient states that he ran out of medications about 2-3 days ago, but has not noticed any symptoms of his ADHD. He denies any changes in his symptoms and states that he has not had any side effects while taking adderall. Patient denies any headache, diarrhea, constipation, nausea, vomiting, fever or chills, feeling restless, changes to energy levels, difficulty sleeping, or diet.  He is planning on switching to a primary care provider in the [facility] system, as this is what his insurance covers.     Problem list and histories reviewed & adjusted, as indicated.  Additional history: as documented    Patient Active Problem List   Diagnosis     Attention deficit hyperactivity disorder (ADHD)     History reviewed. No pertinent surgical history.    Social History   Substance Use Topics     Smoking status: Never Smoker     Smokeless tobacco: Current User     Types: Chew     Alcohol use No     Family History   Problem Relation Age of Onset     DIABETES No family hx of      C.A.D. No family hx of      Hypertension No family hx of          Current Outpatient Prescriptions   Medication Sig Dispense Refill     amphetamine-dextroamphetamine (ADDERALL XR) 30 MG per 24 hr capsule Take 1 capsule (30 mg) by mouth daily 30 capsule 0     [START ON 3/24/2018] amphetamine-dextroamphetamine (ADDERALL XR) 30 MG per 24 hr capsule Take 1 capsule (30 mg) by mouth daily 30 capsule 0     [START ON 4/24/2018] amphetamine-dextroamphetamine (ADDERALL XR) 30 MG per 24 hr  "capsule Take 1 capsule (30 mg) by mouth daily 30 capsule 0     [START ON 4/21/2018] dextroamphetamine (DEXTROSTAT) 10 MG tablet 1 to 1 1/2 tabs at 4 PM 45 tablet 0     amphetamine-dextroamphetamine (ADDERALL XR) 30 MG per 24 hr capsule Take 1 capsule (30 mg) by mouth daily 30 capsule 0     order for DME Equipment being ordered: wrist brace 1 Units 0     albuterol (PROAIR HFA, PROVENTIL HFA, VENTOLIN HFA) 108 (90 BASE) MCG/ACT inhaler Inhale 2 puffs into the lungs every 4 hours as needed for shortness of breath / dyspnea or wheezing (Patient not taking: Reported on 2/21/2018) 1 Inhaler 0       Reviewed and updated as needed this visit by clinical staff       Reviewed and updated as needed this visit by Provider         ROS:  CONSTITUTIONAL: NEGATIVE for fever, chills, change in weight  EYES: NEGATIVE for vision changes or irritation  ENT/MOUTH: NEGATIVE for ear, mouth and throat problems  RESP: NEGATIVE for significant cough or SOB  CV: NEGATIVE for chest pain, palpitations or peripheral edema  GI: NEGATIVE for nausea, abdominal pain, heartburn, or change in bowel habits  MUSCULOSKELETAL: NEGATIVE for significant arthralgias or myalgia  NEURO: NEGATIVE for weakness, dizziness or paresthesias  PSYCHIATRIC: NEGATIVE for changes in mood or affect    OBJECTIVE:     /72 (BP Location: Right arm, Patient Position: Chair, Cuff Size: Adult Regular)  Pulse 60  Temp 97.8  F (36.6  C) (Oral)  Ht 5' 7.5\" (1.715 m)  Wt 229 lb 6.4 oz (104.1 kg)  SpO2 96%  BMI 35.4 kg/m2  Body mass index is 35.4 kg/(m^2).  GENERAL: Overweight young adult, alert and no distress  EYES: Eyes grossly normal to inspection, PERRL and conjunctivae and sclerae normal  HENT: ear canals and TM's normal, nose and mouth without ulcers or lesions  NECK: no adenopathy, no asymmetry, masses, or scars and thyroid normal to palpation  RESP: lungs clear to auscultation - no rales, rhonchi or wheezes  CV: regular rate and rhythm, normal S1 S2, no S3 or S4, " no murmur, click or rub,   ABDOMEN: soft, nontender, no hepatosplenomegaly, no masses and bowel sounds normal  MS: no gross musculoskeletal defects noted, no edema  NEURO: Normal strength and tone, mentation intact and speech normal  PSYCH: mentation appears normal, affect normal/bright    Diagnostic Test Results:  none     ASSESSMENT/PLAN:       1. Attention deficit hyperactivity disorder (ADHD), combined type  Patient states he is tolerating the medication well without any symptoms. He denies any headache, diarrhea, constipation, nausea, vomiting, fever or chills, feeling restless, changes to energy levels, difficulty sleeping, or diet. He has lost about five pounds since July, but this is within his normal range in the past. We discussed that he would need to be seen again in six months if he doesn't have a new primary doctor at that time.  - amphetamine-dextroamphetamine (ADDERALL XR) 30 MG per 24 hr capsule; Take 1 capsule (30 mg) by mouth daily  Dispense: 30 capsule; Refill: 0  - amphetamine-dextroamphetamine (ADDERALL XR) 30 MG per 24 hr capsule; Take 1 capsule (30 mg) by mouth daily  Dispense: 30 capsule; Refill: 0  - amphetamine-dextroamphetamine (ADDERALL XR) 30 MG per 24 hr capsule; Take 1 capsule (30 mg) by mouth daily  Dispense: 30 capsule; Refill: 0  - dextroamphetamine (DEXTROSTAT) 10 MG tablet; 1 to 1 1/2 tabs at 4 PM  Dispense: 45 tablet; Refill: 0    Call in three month for next three prescriptions and follow up in 6 months to be reevaluated if patient does not have a new primary physician at that time.    I, Vinh Cuevas, MS4, functioned as a scribe in the writing of this note for Dr. Harden.    Christiana Harden MD  [facility] [place]

## 2018-02-24 NOTE — TELEPHONE ENCOUNTER
PA Initiation    Medication: Adderall XR 30 mg daily  Insurance Company: [employer] - Phone [phone] Fax [phone]  Pharmacy Filling the Rx: [id number] [place], [place] - [address]  Filling Pharmacy Phone: [phone]  Filling Pharmacy Fax: [phone]  Start Date: 2/24/2018    Central Prior Authorization Team   Phone: [phone]        Awaiting additional questions via CMM

## 2018-02-26 NOTE — TELEPHONE ENCOUNTER
Prior Authorization Approval    Authorization Effective Date: 1/27/2018  Authorization Expiration Date: 2/26/2019  Medication: Adderall XR 30 mg daily  Approved Dose/Quantity: 30/30 days  Reference #: [id number]   Insurance Company: [employer] - Phone [phone] Fax [phone]  Expected CoPay: $89.83     CoPay Card Available:    n/a  Foundation Assistance Needed:  n/a  Which Pharmacy is filling the prescription (Not needed for infusion/clinic administered): [id number] - [place], [place] - [address]  Pharmacy Notified: Yes  Patient Notified: YesComment:  left voicemail    Will document approval letter once received.

## 2018-02-26 NOTE — TELEPHONE ENCOUNTER
Mom, Teressa, calling being upset that the PA need to go through [facility] pharmacy, not [facility] pharmacy. Pt is already out of m,ed for 9 days & would like us to send the PA to the correct pharmacy ASAP. With any questions, please call mom at [phone]. Thanks.     Dejon, RN  Triage Nurse

## 2018-04-30 DIAGNOSIS — F90.2 ATTENTION DEFICIT HYPERACTIVITY DISORDER (ADHD), COMBINED TYPE: ICD-10-CM

## 2018-04-30 RX ORDER — DEXTROAMPHETAMINE SACCHARATE, AMPHETAMINE ASPARTATE MONOHYDRATE, DEXTROAMPHETAMINE SULFATE AND AMPHETAMINE SULFATE 7.5; 7.5; 7.5; 7.5 MG/1; MG/1; MG/1; MG/1
30 CAPSULE, EXTENDED RELEASE ORAL DAILY
Qty: 30 CAPSULE | Refills: 0 | Status: CANCELLED | OUTPATIENT
Start: 2018-04-30

## 2018-05-01 RX ORDER — DEXTROAMPHETAMINE SULFATE 10 MG/1
TABLET ORAL
Qty: 45 TABLET | Refills: 0 | Status: SHIPPED | OUTPATIENT
Start: 2018-07-01 | End: 2018-07-31

## 2018-05-01 RX ORDER — DEXTROAMPHETAMINE SACCHARATE, AMPHETAMINE ASPARTATE MONOHYDRATE, DEXTROAMPHETAMINE SULFATE AND AMPHETAMINE SULFATE 7.5; 7.5; 7.5; 7.5 MG/1; MG/1; MG/1; MG/1
30 CAPSULE, EXTENDED RELEASE ORAL DAILY
Qty: 30 CAPSULE | Refills: 0 | Status: SHIPPED | OUTPATIENT
Start: 2018-05-01 | End: 2018-05-31

## 2018-05-01 RX ORDER — DEXTROAMPHETAMINE SACCHARATE, AMPHETAMINE ASPARTATE MONOHYDRATE, DEXTROAMPHETAMINE SULFATE AND AMPHETAMINE SULFATE 7.5; 7.5; 7.5; 7.5 MG/1; MG/1; MG/1; MG/1
30 CAPSULE, EXTENDED RELEASE ORAL DAILY
Qty: 30 CAPSULE | Refills: 0 | Status: SHIPPED | OUTPATIENT
Start: 2018-07-02 | End: 2018-08-01

## 2018-05-01 RX ORDER — DEXTROAMPHETAMINE SACCHARATE, AMPHETAMINE ASPARTATE MONOHYDRATE, DEXTROAMPHETAMINE SULFATE AND AMPHETAMINE SULFATE 7.5; 7.5; 7.5; 7.5 MG/1; MG/1; MG/1; MG/1
30 CAPSULE, EXTENDED RELEASE ORAL DAILY
Qty: 30 CAPSULE | Refills: 0 | Status: SHIPPED | OUTPATIENT
Start: 2018-06-01 | End: 2018-07-01

## 2018-05-01 RX ORDER — DEXTROAMPHETAMINE SULFATE 10 MG/1
TABLET ORAL
Qty: 45 TABLET | Refills: 0 | Status: SHIPPED | OUTPATIENT
Start: 2018-06-01 | End: 2018-05-01

## 2018-05-01 RX ORDER — DEXTROAMPHETAMINE SULFATE 10 MG/1
TABLET ORAL
Qty: 45 TABLET | Refills: 0 | Status: SHIPPED | OUTPATIENT
Start: 2018-05-01 | End: 2018-05-01

## 2018-07-31 DIAGNOSIS — F90.2 ATTENTION DEFICIT HYPERACTIVITY DISORDER (ADHD), COMBINED TYPE: ICD-10-CM

## 2018-07-31 RX ORDER — DEXTROAMPHETAMINE SULFATE 10 MG/1
TABLET ORAL
Qty: 45 TABLET | Refills: 0 | Status: SHIPPED | OUTPATIENT
Start: 2018-07-31 | End: 2018-08-24

## 2018-07-31 RX ORDER — DEXTROAMPHETAMINE SACCHARATE, AMPHETAMINE ASPARTATE MONOHYDRATE, DEXTROAMPHETAMINE SULFATE AND AMPHETAMINE SULFATE 7.5; 7.5; 7.5; 7.5 MG/1; MG/1; MG/1; MG/1
30 CAPSULE, EXTENDED RELEASE ORAL DAILY
Qty: 30 CAPSULE | Refills: 0 | Status: SHIPPED | OUTPATIENT
Start: 2018-07-31 | End: 2018-11-15

## 2018-07-31 NOTE — TELEPHONE ENCOUNTER
Patient needs refill ASAP. Has appt with PCP scheduled for 8/24/18, if needs to be seen for any refills please call and help schedule for sooner. Would like to  the paper copy of the Rx at the  [facility] , please call when ready.  -Lesvia Pan

## 2018-07-31 NOTE — TELEPHONE ENCOUNTER
Routing refill request to provider for review/approval because:  Drug not on the [facility], [facility] or [facility] refill protocol or controlled substance    Adderall XR 30mg      Last Written Prescription Date:  7/2/18  Last Fill Quantity: 30,   # refills: 0    Dextrostat 10 mg      Last Written Prescription Date:  7/1/18  Last Fill Quantity: 45,   # refills: 0    Last Office Visit: 2/21/18  Future Office visit:    Next 5 appointments (look out 90 days)     Aug 24, 2018  8:00 AM CDT   [facility]    [address] [place] [address]  [address]  [facility] [address] [phone]                 PCP checked: No, not on approved list yet for Cely Mccabe RN

## 2018-08-12 ENCOUNTER — TRANSFERRED RECORDS (OUTPATIENT)
Dept: HEALTH INFORMATION MANAGEMENT | Facility: CLINIC | Age: 23
End: 2018-08-12

## 2018-08-24 ENCOUNTER — OFFICE VISIT (OUTPATIENT)
Dept: PEDIATRICS | Facility: CLINIC | Age: 23
End: 2018-08-24
Payer: COMMERCIAL

## 2018-08-24 VITALS
TEMPERATURE: 97.8 F | OXYGEN SATURATION: 98 % | WEIGHT: 225.7 LBS | DIASTOLIC BLOOD PRESSURE: 78 MMHG | HEART RATE: 58 BPM | HEIGHT: 68 IN | SYSTOLIC BLOOD PRESSURE: 116 MMHG | BODY MASS INDEX: 34.21 KG/M2

## 2018-08-24 DIAGNOSIS — F90.2 ATTENTION DEFICIT HYPERACTIVITY DISORDER (ADHD), COMBINED TYPE: ICD-10-CM

## 2018-08-24 PROCEDURE — 99214 OFFICE O/P EST MOD 30 MIN: CPT | Performed by: INTERNAL MEDICINE

## 2018-08-24 RX ORDER — DEXTROAMPHETAMINE SULFATE 10 MG/1
TABLET ORAL
Qty: 45 TABLET | Refills: 0 | Status: SHIPPED | OUTPATIENT
Start: 2018-10-19 | End: 2018-11-15

## 2018-08-24 RX ORDER — DEXTROAMPHETAMINE SULFATE 10 MG/1
TABLET ORAL
Qty: 45 TABLET | Refills: 0 | Status: SHIPPED | OUTPATIENT
Start: 2018-09-21 | End: 2018-08-24

## 2018-08-24 RX ORDER — DEXTROAMPHETAMINE SACCHARATE, AMPHETAMINE ASPARTATE MONOHYDRATE, DEXTROAMPHETAMINE SULFATE AND AMPHETAMINE SULFATE 7.5; 7.5; 7.5; 7.5 MG/1; MG/1; MG/1; MG/1
30 CAPSULE, EXTENDED RELEASE ORAL DAILY
Qty: 30 CAPSULE | Refills: 0 | Status: SHIPPED | OUTPATIENT
Start: 2018-08-24 | End: 2018-09-23

## 2018-08-24 RX ORDER — DEXTROAMPHETAMINE SACCHARATE, AMPHETAMINE ASPARTATE MONOHYDRATE, DEXTROAMPHETAMINE SULFATE AND AMPHETAMINE SULFATE 7.5; 7.5; 7.5; 7.5 MG/1; MG/1; MG/1; MG/1
30 CAPSULE, EXTENDED RELEASE ORAL DAILY
Qty: 30 CAPSULE | Refills: 0 | Status: SHIPPED | OUTPATIENT
Start: 2018-09-21 | End: 2018-10-21

## 2018-08-24 RX ORDER — DEXTROAMPHETAMINE SULFATE 10 MG/1
TABLET ORAL
Qty: 45 TABLET | Refills: 0 | Status: SHIPPED | OUTPATIENT
Start: 2018-08-24 | End: 2018-08-24

## 2018-08-24 NOTE — PROGRESS NOTES
SUBJECTIVE:   Baldomero Donato is a 23 year old male who presents to clinic today for the following health issues:      ADHD Med Check    Pt reports that medication is working well.       Amount of exercise or physical activity: 4-5 days/week for an average of 45-60 minutes    Problems taking medications regularly: No    Medication side effects: none    Diet: regular (no restrictions)      Patient reports adderall are is working well for him. He notes in the morning he feels different on medications, but not during the day. Depending on the day he takes afternoon dose. Denies headaches, trouble sleeping. He would like to continue with medications through his last semester of school. BP in clinic was 116/78, weight was 225 lbs.         Problem list and histories reviewed & adjusted, as indicated.  Additional history: as documented    Patient Active Problem List   Diagnosis     Attention deficit hyperactivity disorder (ADHD)     History reviewed. No pertinent surgical history.    Social History   Substance Use Topics     Smoking status: Never Smoker     Smokeless tobacco: Current User     Types: Chew     Alcohol use No     Family History   Problem Relation Age of Onset     Diabetes No family hx of      C.A.D. No family hx of      Hypertension No family hx of          Current Outpatient Prescriptions   Medication Sig Dispense Refill     amphetamine-dextroamphetamine (ADDERALL XR) 30 MG per 24 hr capsule Take 1 capsule (30 mg) by mouth daily 30 capsule 0     dextroamphetamine (DEXTROSTAT) 10 MG tablet 1 to 1 1/2 tabs at 4 PM 45 tablet 0     order for DME Equipment being ordered: wrist brace 1 Units 0     albuterol (PROAIR HFA, PROVENTIL HFA, VENTOLIN HFA) 108 (90 BASE) MCG/ACT inhaler Inhale 2 puffs into the lungs every 4 hours as needed for shortness of breath / dyspnea or wheezing (Patient not taking: Reported on 2/21/2018) 1 Inhaler 0     Allergies   Allergen Reactions     Bees      BP Readings from Last 3  "Encounters:   08/24/18 116/78   02/21/18 118/72   08/16/17 124/78    Wt Readings from Last 3 Encounters:   08/24/18 102.4 kg (225 lb 11.2 oz)   02/21/18 104.1 kg (229 lb 6.4 oz)   08/16/17 106.3 kg (234 lb 6 oz)                    Reviewed and updated as needed this visit by clinical staff  Tobacco  Allergies  Meds  Med Hx  Surg Hx  Fam Hx  Soc Hx      Reviewed and updated as needed this visit by Provider         ROS:  Constitutional, HEENT, cardiovascular, pulmonary, GI, , musculoskeletal, neuro, skin, endocrine and psych systems are negative, except as otherwise noted.    This document serves as a record of the services and decisions personally performed and made by Christiana Harden MD. It was created on her behalf by Manisha Castro, a trained medical scribe. The creation of this document is based the provider's statements to the medical scribe.    Manisha Castro August 24, 2018 8:15 AM  OBJECTIVE:     /78 (BP Location: Right arm, Patient Position: Sitting, Cuff Size: Adult Large)  Pulse 58  Temp 97.8  F (36.6  C) (Oral)  Ht 1.715 m (5' 7.5\")  Wt 102.4 kg (225 lb 11.2 oz)  SpO2 98%  BMI 34.83 kg/m2  Body mass index is 34.83 kg/(m^2).  GENERAL:  alert and no distress  RESP: lungs clear to auscultation - no rales, rhonchi or wheezes  CV: regular rate and rhythm, normal S1 S2, no S3 or S4, no murmur, click or rub   Abdomen:  BS+ soft, nt nd.   PSYCH: mentation appears normal, affect normal/bright    Diagnostic Test Results:  none     ASSESSMENT/PLAN:   (F90.2) Attention deficit hyperactivity disorder (ADHD), combined type  -- adderall working for patient, will continue until finishing last semester of school and try to come off   -- no side effects reported, BP and weight good   -- not taking short acting dose daily, but most days, refilled   Plan: amphetamine-dextroamphetamine (ADDERALL XR) 30         MG per 24 hr capsule,         amphetamine-dextroamphetamine (ADDERALL XR) 30         MG per 24 " hr capsule,         amphetamine-dextroamphetamine (ADDERALL XR) 30         MG per 24 hr capsule, dextroamphetamine         (DEXTROSTAT) 10 MG tablet, DISCONTINUED:         dextroamphetamine (DEXTROSTAT) 10 MG tablet,         DISCONTINUED: dextroamphetamine (DEXTROSTAT) 10        MG tablet            Follow up in 6 months or as needed.           The information in this document, created by the medical scribe for me, accurately reflects the services I personally performed and the decisions made by me. I have reviewed and approved this document for accuracy prior to leaving the patient care area.  Christiana Harden MD  [facility]

## 2018-08-24 NOTE — MR AVS SNAPSHOT
"              After Visit Summary   2018    Baldomero Donato    MRN: [id number]           Patient Information     Date Of Birth          1995        Visit Information        Provider Department      2018 8:00 AM [facility]        Today's Diagnoses     Attention deficit hyperactivity disorder (ADHD), combined type           Follow-ups after your visit        Follow-up notes from your care team     Return in about 6 months (around 2019) for ADHD follow up.      Who to contact     If you have questions or need follow up information about today's clinic visit or your schedule please contact [facility] directly at [phone].  Normal or non-critical lab and imaging results will be communicated to you by MyChart, letter or phone within 4 business days after the clinic has received the results. If you do not hear from us within 7 days, please contact the clinic through MyChart or phone. If you have a critical or abnormal lab result, we will notify you by phone as soon as possible.  Submit refill requests through [employer] or call your pharmacy and they will forward the refill request to us. Please allow 3 business days for your refill to be completed.          Additional Information About Your Visit        MyChart Information     [employer] lets you send messages to your doctor, view your test results, renew your prescriptions, schedule appointments and more. To sign up, go to [email or web address] . Click on \"Log in\" on the left side of the screen, which will take you to the Welcome page. Then click on \"Sign up Now\" on the right side of the page.     You will be asked to enter the access code listed below, as well as some personal information. Please follow the directions to create your username and password.     Your access code is: [email or web address]  Expires: 2018  8:22 AM     Your access code will  in 90 days. If you need help or a " "new code, please call your [facility] clinic or [phone].        Care EveryWhere ID     This is your Care EveryWhere ID. This could be used by other organizations to access your [facility] medical records  [id number]        Your Vitals Were     Pulse Temperature Height Pulse Oximetry BMI (Body Mass Index)       58 97.8  F (36.6  C) (Oral) 5' 7.5\" (1.715 m) 98% 34.83 kg/m2        Blood Pressure from Last 3 Encounters:   08/24/18 116/78   02/21/18 118/72   08/16/17 124/78    Weight from Last 3 Encounters:   08/24/18 225 lb 11.2 oz (102.4 kg)   02/21/18 229 lb 6.4 oz (104.1 kg)   08/16/17 234 lb 6 oz (106.3 kg)              Today, you had the following     No orders found for display         Today's Medication Changes          These changes are accurate as of 8/24/18  8:22 AM.  If you have any questions, ask your nurse or doctor.               Start taking these medicines.        Dose/Directions    dextroamphetamine 10 MG tablet   Commonly known as:  DEXTROSTAT   Used for:  Attention deficit hyperactivity disorder (ADHD), combined type   Started by:  Christiana Harden MD        Start taking on:  10/19/2018   1 to 1 1/2 tabs at 4 PM   Quantity:  45 tablet   Refills:  0         These medicines have changed or have updated prescriptions.        Dose/Directions    * amphetamine-dextroamphetamine 30 MG per 24 hr capsule   Commonly known as:  ADDERALL XR   This may have changed:  Another medication with the same name was added. Make sure you understand how and when to take each.   Used for:  Attention deficit hyperactivity disorder (ADHD), combined type   Changed by:  Christiana Harden MD        Dose:  30 mg   Take 1 capsule (30 mg) by mouth daily   Quantity:  30 capsule   Refills:  0       * amphetamine-dextroamphetamine 30 MG per 24 hr capsule   Commonly known as:  ADDERALL XR   This may have changed:  You were already taking a medication with the same name, and this prescription was added. Make sure you " understand how and when to take each.   Used for:  Attention deficit hyperactivity disorder (ADHD), combined type   Changed by:  Christiana Harden MD        Dose:  30 mg   Take 1 capsule (30 mg) by mouth daily   Quantity:  30 capsule   Refills:  0       * amphetamine-dextroamphetamine 30 MG per 24 hr capsule   Commonly known as:  ADDERALL XR   This may have changed:  You were already taking a medication with the same name, and this prescription was added. Make sure you understand how and when to take each.   Used for:  Attention deficit hyperactivity disorder (ADHD), combined type   Changed by:  Christiana Harden MD        Dose:  30 mg   Start taking on:  9/21/2018   Take 1 capsule (30 mg) by mouth daily   Quantity:  30 capsule   Refills:  0       * amphetamine-dextroamphetamine 30 MG per 24 hr capsule   Commonly known as:  ADDERALL XR   This may have changed:  You were already taking a medication with the same name, and this prescription was added. Make sure you understand how and when to take each.   Used for:  Attention deficit hyperactivity disorder (ADHD), combined type   Changed by:  Christiana Harden MD        Dose:  30 mg   Start taking on:  9/21/2018   Take 1 capsule (30 mg) by mouth daily   Quantity:  30 capsule   Refills:  0       * Notice:  This list has 4 medication(s) that are the same as other medications prescribed for you. Read the directions carefully, and ask your doctor or other care provider to review them with you.         Where to get your medicines      Some of these will need a paper prescription and others can be bought over the counter.  Ask your nurse if you have questions.     Bring a paper prescription for each of these medications     amphetamine-dextroamphetamine 30 MG per 24 hr capsule    amphetamine-dextroamphetamine 30 MG per 24 hr capsule    amphetamine-dextroamphetamine 30 MG per 24 hr capsule    dextroamphetamine 10 MG tablet                Primary Care  Provider Office Phone # Fax #    Christiana Harden [phone] [address] [place] [address]        Equal Access to Services     JYOTSNAOLI BLAIRJUANY : Hadximena khang barcenas amiraho Fredy, waaxda luqadaha, qaybta kaalmada scott, shellie membreno latristangenia flaquito. So [facility] [phone].    ATENCIÓN: Si habla español, tiene a mane disposición servicios gratuitos de asistencia lingüística. Llame al [phone].    We comply with applicable federal civil rights laws and [place] laws. We do not discriminate on the basis of race, color, national origin, age, disability, sex, sexual orientation, or gender identity.            Thank you!     [facility] you for [facility]  [facility] your care. Our goal is always to provide you with excellent care. Hearing back from our patients is one way we can continue to improve our services. Please take a few minutes to complete the written survey that you may receive in the mail after your visit with us. Thank you!             Your Updated Medication List - Protect others around you: Learn how to safely use, store and throw away your medicines at www.disposemymeds.org.          This list is accurate as of 8/24/18  8:22 AM.  Always use your most recent med list.                   Brand Name Dispense Instructions for use Diagnosis    albuterol 108 (90 Base) MCG/ACT inhaler    PROAIR HFA/PROVENTIL HFA/VENTOLIN HFA    1 Inhaler    Inhale 2 puffs into the lungs every 4 hours as needed for shortness of breath / dyspnea or wheezing    Acute bronchospasm       * amphetamine-dextroamphetamine 30 MG per 24 hr capsule    ADDERALL XR    30 capsule    Take 1 capsule (30 mg) by mouth daily    Attention deficit hyperactivity disorder (ADHD), combined type       * amphetamine-dextroamphetamine 30 MG per 24 hr capsule    ADDERALL XR    30 capsule    Take 1 capsule (30 mg) by mouth daily    Attention deficit hyperactivity disorder (ADHD), combined type        * amphetamine-dextroamphetamine 30 MG per 24 hr capsule   Start taking on:  9/21/2018    ADDERALL XR    30 capsule    Take 1 capsule (30 mg) by mouth daily    Attention deficit hyperactivity disorder (ADHD), combined type       * amphetamine-dextroamphetamine 30 MG per 24 hr capsule   Start taking on:  9/21/2018    ADDERALL XR    30 capsule    Take 1 capsule (30 mg) by mouth daily    Attention deficit hyperactivity disorder (ADHD), combined type       dextroamphetamine 10 MG tablet   Start taking on:  10/19/2018    DEXTROSTAT    45 tablet    1 to 1 1/2 tabs at 4 PM    Attention deficit hyperactivity disorder (ADHD), combined type       order for DME     1 Units    Equipment being ordered: wrist brace    Numbness of left hand       * Notice:  This list has 4 medication(s) that are the same as other medications prescribed for you. Read the directions carefully, and ask your doctor or other care provider to review them with you.

## 2018-11-15 DIAGNOSIS — F90.2 ATTENTION DEFICIT HYPERACTIVITY DISORDER (ADHD), COMBINED TYPE: ICD-10-CM

## 2018-11-15 NOTE — TELEPHONE ENCOUNTER
Patient requesting a 60 day supply.    Requested Prescriptions   Pending Prescriptions Disp Refills     amphetamine-dextroamphetamine (ADDERALL XR) 30 MG per 24 hr capsule 30 capsule 0     Sig: Take 1 capsule (30 mg) by mouth daily    There is no refill protocol information for this order        dextroamphetamine (DEXTROSTAT) 10 MG tablet 45 tablet 0     Si to 1 1/2 tabs at 4 PM    There is no refill protocol information for this order        Mary Jo Youssef  FWF - TC/FD  11/15/2018 2:40 PM

## 2018-11-19 RX ORDER — DEXTROAMPHETAMINE SULFATE 10 MG/1
TABLET ORAL
Qty: 45 TABLET | Refills: 0 | Status: SHIPPED | OUTPATIENT
Start: 2018-11-19 | End: 2018-12-12

## 2018-11-19 RX ORDER — DEXTROAMPHETAMINE SACCHARATE, AMPHETAMINE ASPARTATE MONOHYDRATE, DEXTROAMPHETAMINE SULFATE AND AMPHETAMINE SULFATE 7.5; 7.5; 7.5; 7.5 MG/1; MG/1; MG/1; MG/1
30 CAPSULE, EXTENDED RELEASE ORAL DAILY
Qty: 30 CAPSULE | Refills: 0 | Status: SHIPPED | OUTPATIENT
Start: 2018-11-19 | End: 2018-12-13

## 2018-11-19 NOTE — TELEPHONE ENCOUNTER
Pt is calling for the medication and he is out of Dextrostat and would like it filled as soon as possible.

## 2018-11-19 NOTE — TELEPHONE ENCOUNTER
Rx walked to [facility] 1st floor .  Called and spoke with  Patient that he can come  rx.  Lissette Reyes, CMA

## 2018-12-12 ENCOUNTER — TELEPHONE (OUTPATIENT)
Dept: PEDIATRICS | Facility: CLINIC | Age: 23
End: 2018-12-12

## 2018-12-12 DIAGNOSIS — F90.2 ATTENTION DEFICIT HYPERACTIVITY DISORDER (ADHD), COMBINED TYPE: ICD-10-CM

## 2018-12-12 NOTE — TELEPHONE ENCOUNTER
Patient is scheduled for follow-up on 01/16.  Is requesting two month supply of Adderall XR and Dextrostat.      [place]: Adderall XR filled on 12/02/18  Dextrostat filled on 11/20/18    I advised patient that it is too early to refill the Adderall XR.  I post-dated the Dextrostat to next Monday, 12/17.    [place]  [email or web address]  Please call patient when ready to pick-up at the  at [phone].    OK TO LM.    Adderall XR 30 mg      Last Written Prescription Date:  11/19/18  Last Fill Quantity: 30,   # refills: 0  Last Office Visit: 08/24/18  Future Office visit:    Next 5 appointments (look out 90 days)    Jan 16, 2019  1:40 PM CST  SHORT with Christiana Harden MD  [facility] ([facility] [address] [place] [address]  [address]  [facility] [address]  [phone]           Routing refill request to provider for review/approval because:  Drug not on the [facility], [facility] or [facility] refill protocol or controlled substance    Dextrostat 10 mg      Last Written Prescription Date:  11/19/18  Last Fill Quantity: 45,   # refills: 0  Last Office Visit: 08/24/18  Future Office visit:    Next 5 appointments (look out 90 days)    Jan 16, 2019  1:40 PM CST  SHORT with Christiana Harden MD  [facility] ([facility]) [address] [place] [address]  [address]  [facility] [address]  [phone]           Routing refill request to provider for review/approval because:  Drug not on the [facility], [facility] or [facility] refill protocol or controlled substance    RX monitoring program ([place] reviewed: Adderall XR filled on 12/02/18  Dextrostat filled on 11/20/18    I advised patient that it is too early to refill the Adderall XR.  I post-dated the Dextrostat to next Monday, 12/17.    [place]  [email or web address]  BACILIO Gleason RN

## 2018-12-12 NOTE — TELEPHONE ENCOUNTER
Reason for Call:  Other call back    Detailed comments: The pt was calling and he would like to speak to his care team about his Adderall medication. No further information given.     Phone Number Patient can be reached at: Home number on file [phone] (home)    Best Time: Anytime    Can we leave a detailed message on this number? YES    Call taken on 12/12/2018 at 1:09 PM by Anabell Hernandez

## 2018-12-13 RX ORDER — DEXTROAMPHETAMINE SULFATE 10 MG/1
TABLET ORAL
Qty: 45 TABLET | Refills: 0 | Status: SHIPPED | OUTPATIENT
Start: 2019-02-17 | End: 2019-06-02

## 2018-12-13 RX ORDER — DEXTROAMPHETAMINE SACCHARATE, AMPHETAMINE ASPARTATE MONOHYDRATE, DEXTROAMPHETAMINE SULFATE AND AMPHETAMINE SULFATE 7.5; 7.5; 7.5; 7.5 MG/1; MG/1; MG/1; MG/1
30 CAPSULE, EXTENDED RELEASE ORAL DAILY
Qty: 30 CAPSULE | Refills: 0 | Status: SHIPPED | OUTPATIENT
Start: 2018-12-13 | End: 2018-12-13

## 2018-12-13 RX ORDER — DEXTROAMPHETAMINE SULFATE 10 MG/1
TABLET ORAL
Qty: 45 TABLET | Refills: 0 | Status: SHIPPED | OUTPATIENT
Start: 2019-01-17 | End: 2018-12-13

## 2018-12-13 RX ORDER — DEXTROAMPHETAMINE SULFATE 10 MG/1
TABLET ORAL
Qty: 45 TABLET | Refills: 0 | Status: SHIPPED | OUTPATIENT
Start: 2018-12-17 | End: 2018-12-13

## 2018-12-13 RX ORDER — DEXTROAMPHETAMINE SACCHARATE, AMPHETAMINE ASPARTATE MONOHYDRATE, DEXTROAMPHETAMINE SULFATE AND AMPHETAMINE SULFATE 7.5; 7.5; 7.5; 7.5 MG/1; MG/1; MG/1; MG/1
30 CAPSULE, EXTENDED RELEASE ORAL DAILY
Qty: 30 CAPSULE | Refills: 0 | Status: SHIPPED | OUTPATIENT
Start: 2019-02-13 | End: 2019-03-20

## 2018-12-13 RX ORDER — DEXTROAMPHETAMINE SACCHARATE, AMPHETAMINE ASPARTATE MONOHYDRATE, DEXTROAMPHETAMINE SULFATE AND AMPHETAMINE SULFATE 7.5; 7.5; 7.5; 7.5 MG/1; MG/1; MG/1; MG/1
30 CAPSULE, EXTENDED RELEASE ORAL DAILY
Qty: 30 CAPSULE | Refills: 0 | Status: SHIPPED | OUTPATIENT
Start: 2019-01-13 | End: 2018-12-13

## 2018-12-13 NOTE — TELEPHONE ENCOUNTER
He can actually have a 3 month supply of both; scripts printed.  He would then be due for his next visit in early march.  He can cancel the January visit, and reschedule for march.  Christiana Harden MD

## 2018-12-13 NOTE — TELEPHONE ENCOUNTER
Left message rx is ready and  said he could wait until March for an appt.  Felice, [facility]

## 2019-01-16 ENCOUNTER — OFFICE VISIT (OUTPATIENT)
Dept: PEDIATRICS | Facility: CLINIC | Age: 24
End: 2019-01-16
Payer: COMMERCIAL

## 2019-01-16 VITALS
BODY MASS INDEX: 34.28 KG/M2 | RESPIRATION RATE: 16 BRPM | HEART RATE: 74 BPM | WEIGHT: 226.2 LBS | HEIGHT: 68 IN | TEMPERATURE: 98.1 F | DIASTOLIC BLOOD PRESSURE: 78 MMHG | SYSTOLIC BLOOD PRESSURE: 120 MMHG

## 2019-01-16 DIAGNOSIS — Z23 NEED FOR PROPHYLACTIC VACCINATION AND INOCULATION AGAINST INFLUENZA: ICD-10-CM

## 2019-01-16 DIAGNOSIS — F90.2 ATTENTION DEFICIT HYPERACTIVITY DISORDER (ADHD), COMBINED TYPE: Primary | ICD-10-CM

## 2019-01-16 PROCEDURE — 90686 IIV4 VACC NO PRSV 0.5 ML IM: CPT | Performed by: INTERNAL MEDICINE

## 2019-01-16 PROCEDURE — 90471 IMMUNIZATION ADMIN: CPT | Performed by: INTERNAL MEDICINE

## 2019-01-16 PROCEDURE — 99213 OFFICE O/P EST LOW 20 MIN: CPT | Mod: 25 | Performed by: INTERNAL MEDICINE

## 2019-01-16 RX ORDER — ALBUTEROL SULFATE 90 UG/1
2 AEROSOL, METERED RESPIRATORY (INHALATION) EVERY 4 HOURS PRN
Qty: 1 INHALER | Refills: 0 | Status: CANCELLED | OUTPATIENT
Start: 2019-01-16

## 2019-01-16 RX ORDER — ALBUTEROL SULFATE 0.83 MG/ML
2.5 SOLUTION RESPIRATORY (INHALATION) ONCE
Qty: 3 ML | Refills: 0 | Status: CANCELLED | OUTPATIENT
Start: 2019-01-16 | End: 2019-01-16

## 2019-01-16 RX ORDER — DEXTROAMPHETAMINE SULFATE 10 MG/1
TABLET ORAL
Qty: 45 TABLET | Refills: 0 | Status: CANCELLED | OUTPATIENT
Start: 2019-02-17

## 2019-01-16 RX ORDER — DEXTROAMPHETAMINE SACCHARATE, AMPHETAMINE ASPARTATE MONOHYDRATE, DEXTROAMPHETAMINE SULFATE AND AMPHETAMINE SULFATE 7.5; 7.5; 7.5; 7.5 MG/1; MG/1; MG/1; MG/1
30 CAPSULE, EXTENDED RELEASE ORAL DAILY
Qty: 30 CAPSULE | Refills: 0 | Status: CANCELLED | OUTPATIENT
Start: 2019-02-13

## 2019-01-16 ASSESSMENT — MIFFLIN-ST. JEOR: SCORE: 1997.6

## 2019-01-16 NOTE — PROGRESS NOTES
SUBJECTIVE:   Baldomero Donato is a 23 year old male who presents to clinic today for the following health issues:    Medication Followup of Adderall     Taking Medication as prescribed: yes    Side Effects:  None    Medication Helping Symptoms:  Yes        Patient reports there is no change in effectiveness of addeall. Is taking daily other than on Thursday since he is not in class but about 4-5/7 days per week. Denies headaches, chest pain or shortness of breath. Denies problems with sleep.     Problem list and histories reviewed & adjusted, as indicated.  Additional history:     Patient Active Problem List   Diagnosis     Attention deficit hyperactivity disorder (ADHD)     No past surgical history on file.    Social History     Tobacco Use     Smoking status: Never Smoker     Smokeless tobacco: Current User     Types: Chew   Substance Use Topics     Alcohol use: No     Alcohol/week: 0.0 oz     Family History   Problem Relation Age of Onset     Diabetes No family hx of      C.A.D. No family hx of      Hypertension No family hx of          Current Outpatient Medications   Medication Sig Dispense Refill     [START ON 2/13/2019] amphetamine-dextroamphetamine (ADDERALL XR) 30 MG 24 hr capsule Take 1 capsule (30 mg) by mouth daily 30 capsule 0     [START ON 2/17/2019] dextroamphetamine (DEXTROSTAT) 10 MG tablet 1 to 1 1/2 tabs at 4 PM 45 tablet 0     order for DME Equipment being ordered: wrist brace 1 Units 0     albuterol (2.5 MG/3ML) 0.083% nebulizer solution Take 1 vial (2.5 mg) by nebulization once for 1 dose 3 mL 0     albuterol (PROAIR HFA, PROVENTIL HFA, VENTOLIN HFA) 108 (90 BASE) MCG/ACT inhaler Inhale 2 puffs into the lungs every 4 hours as needed for shortness of breath / dyspnea or wheezing (Patient not taking: Reported on 2/21/2018) 1 Inhaler 0     Allergies   Allergen Reactions     Bees      BP Readings from Last 3 Encounters:   01/16/19 120/90   08/24/18 116/78   02/21/18 118/72    Wt Readings  "from Last 3 Encounters:   01/16/19 102.6 kg (226 lb 3.2 oz)   08/24/18 102.4 kg (225 lb 11.2 oz)   02/21/18 104.1 kg (229 lb 6.4 oz)                  Labs reviewed in EPIC    Reviewed and updated as needed this visit by clinical staff       Reviewed and updated as needed this visit by Provider         ROS:  Constitutional, HEENT, cardiovascular, pulmonary, GI, , musculoskeletal, neuro, skin, endocrine and psych systems are negative, except as otherwise noted.    This document serves as a record of the services and decisions personally performed and made by Christiana Harden MD. It was created on her behalf by Manisha Castro, a trained medical scribe. The creation of this document is based the provider's statements to the medical scribe.    Manisha Castro January 16, 2019 1:55 PM  OBJECTIVE:     /90 (BP Location: Right arm, Patient Position: Chair, Cuff Size: Adult Regular)   Pulse 74   Temp 98.1  F (36.7  C) (Oral)   Resp 16   Ht 1.715 m (5' 7.5\")   Wt 102.6 kg (226 lb 3.2 oz)   BMI 34.91 kg/m    Body mass index is 34.91 kg/m .  GENERAL: healthy, alert and no distress  RESP: lungs clear to auscultation - no rales, rhonchi or wheezes  CV: regular rate and rhythm, normal S1 S2, no S3 or S4, no murmur, click or rub, no peripheral edema and peripheral pulses strong  PSYCH: mentation appears normal, affect normal/bright    Diagnostic Test Results:  none     ASSESSMENT/PLAN:   (F90.2) Attention deficit hyperactivity disorder (ADHD), combined type  (primary encounter diagnosis)  -- BP and weight stable   -- well controlled on adderall; continue without changes   -- advised patient to sign up on [employer] to request refill at 3 months     (Z23) Need for prophylactic vaccination and inoculation against influenza  Plan: FLU VACCINE, SPLIT VIRUS, IM (QUADRIVALENT)         [45613]- >3 YRS          Follow up in 6 months for physical exam or as needed.       The information in this document, created by the medical " scribe for me, accurately reflects the services I personally performed and the decisions made by me. I have reviewed and approved this document for accuracy prior to leaving the patient care area.  Christiana Harden MD  [facility]    Injectable Influenza Immunization Documentation    1.  Is the person to be vaccinated sick today?   No    2. Does the person to be vaccinated have an allergy to a component   of the vaccine?   No  Egg Allergy Algorithm Link    3. Has the person to be vaccinated ever had a serious reaction   to influenza vaccine in the past?   No    4. Has the person to be vaccinated ever had Guillain-Barré syndrome?   No    Form completed by Sonal Cedeno MA

## 2019-01-16 NOTE — PATIENT INSTRUCTIONS
When you need next 3 month prescriptions send me a message on [employer] for prescription. Put in comments you need 3 prescriptions.

## 2019-02-28 DIAGNOSIS — F90.2 ATTENTION DEFICIT HYPERACTIVITY DISORDER (ADHD), COMBINED TYPE: ICD-10-CM

## 2019-02-28 RX ORDER — DEXTROAMPHETAMINE SACCHARATE, AMPHETAMINE ASPARTATE MONOHYDRATE, DEXTROAMPHETAMINE SULFATE AND AMPHETAMINE SULFATE 7.5; 7.5; 7.5; 7.5 MG/1; MG/1; MG/1; MG/1
30 CAPSULE, EXTENDED RELEASE ORAL DAILY
Qty: 30 CAPSULE | Refills: 0 | Status: CANCELLED | OUTPATIENT
Start: 2019-02-28

## 2019-02-28 RX ORDER — DEXTROAMPHETAMINE SULFATE 10 MG/1
TABLET ORAL
Qty: 45 TABLET | Refills: 0 | Status: CANCELLED | OUTPATIENT
Start: 2019-02-28

## 2019-02-28 NOTE — TELEPHONE ENCOUNTER
The pt will  the rx at the clinic . Please call him at [phone]. Ok to leave a message if needed.   Anabell Hernandez on 2/28/2019 at 11:00 AM

## 2019-03-04 NOTE — TELEPHONE ENCOUNTER
Refill request too soon.  Last filled with pharmacy 2/21/2019 per    Jessenia RIBERA RN - Triage  [facility]

## 2019-03-05 RX ORDER — DEXTROAMPHETAMINE SULFATE 10 MG/1
TABLET ORAL
Qty: 45 TABLET | Refills: 0 | Status: CANCELLED | OUTPATIENT
Start: 2019-03-05

## 2019-03-05 RX ORDER — DEXTROAMPHETAMINE SACCHARATE, AMPHETAMINE ASPARTATE MONOHYDRATE, DEXTROAMPHETAMINE SULFATE AND AMPHETAMINE SULFATE 7.5; 7.5; 7.5; 7.5 MG/1; MG/1; MG/1; MG/1
30 CAPSULE, EXTENDED RELEASE ORAL DAILY
Qty: 30 CAPSULE | Refills: 0 | Status: CANCELLED | OUTPATIENT
Start: 2019-03-05

## 2019-03-05 NOTE — TELEPHONE ENCOUNTER
Patient called and upset requesting adderall and Dextrostat to be filled.  Patient first reported he was out of medication.  Discussed  findings patient filled adderall on 2/21/2019 with pharmacy and Dextrostat 2/12/2019.  Patient then stated that he wants the written scripts because he is out of the written scripts.    Please review and advise if appropriate to be filled.  Jessenia RIBERA RN - Triage  [facility]

## 2019-03-06 NOTE — TELEPHONE ENCOUNTER
Patient is requesting 3 months of scripts, which is fine to fill.  Please place as 3 separate scripts for both drugs and route to covering provider to fill.  Christiana Harden MD

## 2019-03-07 RX ORDER — DEXTROAMPHETAMINE SULFATE 10 MG/1
TABLET ORAL
Qty: 45 TABLET | Refills: 0 | Status: SHIPPED | OUTPATIENT
Start: 2019-05-03 | End: 2019-05-28

## 2019-03-07 RX ORDER — DEXTROAMPHETAMINE SACCHARATE, AMPHETAMINE ASPARTATE MONOHYDRATE, DEXTROAMPHETAMINE SULFATE AND AMPHETAMINE SULFATE 7.5; 7.5; 7.5; 7.5 MG/1; MG/1; MG/1; MG/1
30 CAPSULE, EXTENDED RELEASE ORAL DAILY
Qty: 30 CAPSULE | Refills: 0 | Status: SHIPPED | OUTPATIENT
Start: 2019-05-07 | End: 2019-05-28

## 2019-03-07 RX ORDER — DEXTROAMPHETAMINE SULFATE 10 MG/1
TABLET ORAL
Qty: 45 TABLET | Refills: 0 | Status: SHIPPED | OUTPATIENT
Start: 2019-03-07 | End: 2019-06-02

## 2019-03-07 RX ORDER — DEXTROAMPHETAMINE SACCHARATE, AMPHETAMINE ASPARTATE MONOHYDRATE, DEXTROAMPHETAMINE SULFATE AND AMPHETAMINE SULFATE 7.5; 7.5; 7.5; 7.5 MG/1; MG/1; MG/1; MG/1
30 CAPSULE, EXTENDED RELEASE ORAL DAILY
Qty: 30 CAPSULE | Refills: 0 | Status: SHIPPED | OUTPATIENT
Start: 2019-04-06 | End: 2019-06-02

## 2019-03-07 RX ORDER — DEXTROAMPHETAMINE SULFATE 10 MG/1
TABLET ORAL
Qty: 45 TABLET | Refills: 0 | Status: SHIPPED | OUTPATIENT
Start: 2019-04-03 | End: 2019-06-02

## 2019-03-07 RX ORDER — DEXTROAMPHETAMINE SACCHARATE, AMPHETAMINE ASPARTATE MONOHYDRATE, DEXTROAMPHETAMINE SULFATE AND AMPHETAMINE SULFATE 7.5; 7.5; 7.5; 7.5 MG/1; MG/1; MG/1; MG/1
30 CAPSULE, EXTENDED RELEASE ORAL DAILY
Qty: 30 CAPSULE | Refills: 0 | Status: SHIPPED | OUTPATIENT
Start: 2019-03-07 | End: 2019-06-02

## 2019-03-20 ENCOUNTER — MYC MEDICAL ADVICE (OUTPATIENT)
Dept: PEDIATRICS | Facility: CLINIC | Age: 24
End: 2019-03-20

## 2019-03-20 ENCOUNTER — TELEPHONE (OUTPATIENT)
Dept: PEDIATRICS | Facility: CLINIC | Age: 24
End: 2019-03-20

## 2019-03-20 DIAGNOSIS — F90.2 ATTENTION DEFICIT HYPERACTIVITY DISORDER (ADHD), COMBINED TYPE: ICD-10-CM

## 2019-03-20 RX ORDER — DEXTROAMPHETAMINE SACCHARATE, AMPHETAMINE ASPARTATE MONOHYDRATE, DEXTROAMPHETAMINE SULFATE AND AMPHETAMINE SULFATE 7.5; 7.5; 7.5; 7.5 MG/1; MG/1; MG/1; MG/1
30 CAPSULE, EXTENDED RELEASE ORAL DAILY
Qty: 30 CAPSULE | Refills: 0 | Status: SHIPPED | OUTPATIENT
Start: 2019-03-20 | End: 2019-06-02

## 2019-03-20 NOTE — TELEPHONE ENCOUNTER
Mother, calling in to provider information regarding Adderall.  States that Dr. Harden only gave a 2 month supply, and skipped March, and prescribed for April. Mother called insurance company and that is the information they gave. The provider line for Dr. Harden or nurse to call is [phone].    Thanks  Vik Cherry Coodinator

## 2019-03-20 NOTE — TELEPHONE ENCOUNTER
Call received from patient's mother. Patient did not need new script. Was unable to fill because PA was needed. PA initiated and script retrieved from first floor  and placed in shredding.  Sylwia Mann LPN

## 2019-03-20 NOTE — TELEPHONE ENCOUNTER
Per refill encounter of 02/28/19-RXs were take to [facility] 1st floor  on 03/07.  I called the , and RXs are not down there.  Per record book RXs were picked up on 03/10 at noon.    Per , Dextrostat was picked up on 03/11.  BACILIO Gleason RN

## 2019-03-20 NOTE — TELEPHONE ENCOUNTER
Patient possibly needs a prior auth?  Patient is out of meds and is leaving out of town.  Uses [facility]?  Patients mother called for refill and didn't give me much information

## 2019-03-20 NOTE — TELEPHONE ENCOUNTER
Prior Authorization Retail Medication Request    Medication/Dose: Adderall XR 30mg  ICD code (if different than what is on RX):  F90.2  Previously Tried and Failed:  Vyvanse and Dextrostat  Rationale:  Vyvanse not effective enough, currently taking Dextrostat with Adderall    Insurance Name:  [facility]  Insurance ID:  [id number]      Pharmacy Information (if different than what is on RX)  Name:  Michelle  Phone:  [phone]    Needs to be completed ASAP per parent

## 2019-03-21 ENCOUNTER — MYC MEDICAL ADVICE (OUTPATIENT)
Dept: PEDIATRICS | Facility: CLINIC | Age: 24
End: 2019-03-21

## 2019-03-26 NOTE — TELEPHONE ENCOUNTER
Central Prior Authorization Team   Phone: [phone]      PA Initiation    Medication: Adderall XR 30mg-APPROVED  Insurance Company: [employer] - Phone [phone] Fax [phone]  [facility] the Rx[facility] [address] [place] [address]  Filling Pharmacy Phone: [phone]  Filling Pharmacy Fax:    Start Date: 3/26/2019    Started PA on CMM and got a response of An active PA is already on file with expiration date of 03/20/2020. Please wait to resubmit request within 60 days of that expiration date to obtain a PA renewal.

## 2019-03-26 NOTE — TELEPHONE ENCOUNTER
Prior Authorization Approval    Authorization Effective Date: 3/20/2019  Authorization Expiration Date: 3/20/2020  Medication: Adderall XR 30mg-APPROVED  Approved Dose/Quantity:   Reference #:     Insurance Company: [employer] - Phone [phone] Fax [phone]  Expected CoPay:       CoPay Card Available:      Foundation Assistance Needed:    Which Pharmacy is filling the prescription (Not needed for infusion/clinic administered): [facility] [address] [place] [address]  Pharmacy Notified: Yes  Patient Notified: No    Pharmacy is aware but patient needs to bring in script, pharmacy does not have one on file, so it can be filled but pharmacy did a test claim and it processed.

## 2019-05-28 ENCOUNTER — MYC REFILL (OUTPATIENT)
Dept: PEDIATRICS | Facility: CLINIC | Age: 24
End: 2019-05-28

## 2019-05-28 DIAGNOSIS — F90.2 ATTENTION DEFICIT HYPERACTIVITY DISORDER (ADHD), COMBINED TYPE: ICD-10-CM

## 2019-05-29 NOTE — TELEPHONE ENCOUNTER
Routing refill request to provider for review/approval because:  Drug not on the [facility] refill protocol   She message from pt.  Tressa Lynn RN

## 2019-05-31 ENCOUNTER — MYC MEDICAL ADVICE (OUTPATIENT)
Dept: PEDIATRICS | Facility: CLINIC | Age: 24
End: 2019-05-31

## 2019-05-31 DIAGNOSIS — F90.2 ATTENTION DEFICIT HYPERACTIVITY DISORDER (ADHD), COMBINED TYPE: ICD-10-CM

## 2019-05-31 RX ORDER — DEXTROAMPHETAMINE SULFATE 20 MG/1
20 TABLET ORAL DAILY
Qty: 30 TABLET | Refills: 0 | Status: SHIPPED | OUTPATIENT
Start: 2019-05-31 | End: 2019-06-02

## 2019-05-31 RX ORDER — DEXTROAMPHETAMINE SACCHARATE, AMPHETAMINE ASPARTATE MONOHYDRATE, DEXTROAMPHETAMINE SULFATE AND AMPHETAMINE SULFATE 7.5; 7.5; 7.5; 7.5 MG/1; MG/1; MG/1; MG/1
30 CAPSULE, EXTENDED RELEASE ORAL DAILY
Qty: 30 CAPSULE | Refills: 0 | OUTPATIENT
Start: 2019-05-31

## 2019-05-31 RX ORDER — DEXTROAMPHETAMINE SULFATE 10 MG/1
TABLET ORAL
Qty: 45 TABLET | Refills: 0 | OUTPATIENT
Start: 2019-05-31

## 2019-05-31 RX ORDER — DEXTROAMPHETAMINE SACCHARATE, AMPHETAMINE ASPARTATE MONOHYDRATE, DEXTROAMPHETAMINE SULFATE AND AMPHETAMINE SULFATE 7.5; 7.5; 7.5; 7.5 MG/1; MG/1; MG/1; MG/1
30 CAPSULE, EXTENDED RELEASE ORAL DAILY
Qty: 30 CAPSULE | Refills: 0 | Status: SHIPPED | OUTPATIENT
Start: 2019-05-31 | End: 2019-07-08

## 2019-06-02 RX ORDER — DEXTROAMPHETAMINE SULFATE 10 MG/1
TABLET ORAL
Qty: 45 TABLET | Refills: 0 | Status: SHIPPED | OUTPATIENT
Start: 2019-06-02 | End: 2019-06-02

## 2019-06-02 RX ORDER — DEXTROAMPHETAMINE SULFATE 10 MG/1
TABLET ORAL
Qty: 45 TABLET | Refills: 0 | Status: SHIPPED | OUTPATIENT
Start: 2019-06-02 | End: 2019-06-25

## 2019-06-25 ENCOUNTER — MYC REFILL (OUTPATIENT)
Dept: PEDIATRICS | Facility: CLINIC | Age: 24
End: 2019-06-25

## 2019-06-25 DIAGNOSIS — F90.2 ATTENTION DEFICIT HYPERACTIVITY DISORDER (ADHD), COMBINED TYPE: ICD-10-CM

## 2019-06-25 RX ORDER — DEXTROAMPHETAMINE SULFATE 10 MG/1
TABLET ORAL
Qty: 60 TABLET | Refills: 0 | Status: SHIPPED | OUTPATIENT
Start: 2019-06-25 | End: 2019-06-25

## 2019-06-25 RX ORDER — DEXTROAMPHETAMINE SULFATE 10 MG/1
TABLET ORAL
Qty: 60 TABLET | Refills: 0 | Status: SHIPPED | OUTPATIENT
Start: 2019-06-28 | End: 2019-07-25

## 2019-06-25 NOTE — TELEPHONE ENCOUNTER
This is too early. It cannot be refilled until 6/30.  I can send it on Friday.  Christiana Harden MD

## 2019-06-25 NOTE — TELEPHONE ENCOUNTER
Dr. Harden-Left message for patient with information below, please send prescription in on 6-28-19.

## 2019-07-08 ENCOUNTER — MYC REFILL (OUTPATIENT)
Dept: PEDIATRICS | Facility: CLINIC | Age: 24
End: 2019-07-08

## 2019-07-08 DIAGNOSIS — F90.2 ATTENTION DEFICIT HYPERACTIVITY DISORDER (ADHD), COMBINED TYPE: ICD-10-CM

## 2019-07-08 RX ORDER — DEXTROAMPHETAMINE SACCHARATE, AMPHETAMINE ASPARTATE MONOHYDRATE, DEXTROAMPHETAMINE SULFATE AND AMPHETAMINE SULFATE 7.5; 7.5; 7.5; 7.5 MG/1; MG/1; MG/1; MG/1
30 CAPSULE, EXTENDED RELEASE ORAL DAILY
Qty: 30 CAPSULE | Refills: 0 | OUTPATIENT
Start: 2019-07-08

## 2019-07-08 RX ORDER — DEXTROAMPHETAMINE SACCHARATE, AMPHETAMINE ASPARTATE MONOHYDRATE, DEXTROAMPHETAMINE SULFATE AND AMPHETAMINE SULFATE 7.5; 7.5; 7.5; 7.5 MG/1; MG/1; MG/1; MG/1
30 CAPSULE, EXTENDED RELEASE ORAL DAILY
Qty: 30 CAPSULE | Refills: 0 | Status: SHIPPED | OUTPATIENT
Start: 2019-07-08 | End: 2019-08-05

## 2019-07-08 NOTE — TELEPHONE ENCOUNTER
Adderall      Last Written Prescription Date:  5/31/2019  Last Fill Quantity: 30,   # refills: 0  Last Office Visit: 1/16/2019  Future Office visit:       Routing refill request to provider for review/approval because:  Drug not on the [facility], P or  Health refill protocol or controlled substance    RX [place] reviewed:  not reviewed/not due - last done on 7/8/2019    Jessenia RIBERA RN   [facility] [place]

## 2019-07-25 ENCOUNTER — MYC REFILL (OUTPATIENT)
Dept: PEDIATRICS | Facility: CLINIC | Age: 24
End: 2019-07-25

## 2019-07-25 DIAGNOSIS — F90.2 ATTENTION DEFICIT HYPERACTIVITY DISORDER (ADHD), COMBINED TYPE: ICD-10-CM

## 2019-07-25 NOTE — TELEPHONE ENCOUNTER
Requested Prescriptions   Pending Prescriptions Disp Refills     dextroamphetamine (DEXTROSTAT) 10 MG tablet 60 tablet 0     Sig: Take 2 tablets at 4 pm       There is no refill protocol information for this order        Last Written Prescription Date:  06/28/2019  Last Fill Quantity: 60 tablet,  # refills: 0   Last office visit: 1/16/2019 with prescribing provider:  Christiana Harden MD   Future Office Visit:      Routing refill request to provider for review/approval because:  Drug not on the G, P or [facility] refill protocol or controlled substance

## 2019-07-26 ENCOUNTER — MYC REFILL (OUTPATIENT)
Dept: PEDIATRICS | Facility: CLINIC | Age: 24
End: 2019-07-26

## 2019-07-26 DIAGNOSIS — F90.2 ATTENTION DEFICIT HYPERACTIVITY DISORDER (ADHD), COMBINED TYPE: ICD-10-CM

## 2019-07-26 RX ORDER — DEXTROAMPHETAMINE SULFATE 10 MG/1
TABLET ORAL
Qty: 60 TABLET | Refills: 0 | Status: CANCELLED | OUTPATIENT
Start: 2019-07-26

## 2019-07-26 NOTE — TELEPHONE ENCOUNTER
Requested Prescriptions   Pending Prescriptions Disp Refills     dextroamphetamine (DEXTROSTAT) 10 MG tablet  Last Written Prescription Date:  06/28/2018  Last Fill Quantity: 60 tablet,  # refills: 0   Last Office Visit: 1/16/2019 Christiana Harden MD   Future Office Visit:      60 tablet 0     Sig: Take 2 tablets at 4 pm       There is no refill protocol information for this order       Routing refill request to provider for review/approval because:  Drug not on the [facility], [facility] or [facility] refill protocol or controlled substance

## 2019-07-28 ENCOUNTER — MYC REFILL (OUTPATIENT)
Dept: PEDIATRICS | Facility: CLINIC | Age: 24
End: 2019-07-28

## 2019-07-28 DIAGNOSIS — F90.2 ATTENTION DEFICIT HYPERACTIVITY DISORDER (ADHD), COMBINED TYPE: ICD-10-CM

## 2019-07-28 RX ORDER — DEXTROAMPHETAMINE SULFATE 10 MG/1
TABLET ORAL
Qty: 60 TABLET | Refills: 0 | Status: CANCELLED | OUTPATIENT
Start: 2019-07-28

## 2019-07-28 NOTE — TELEPHONE ENCOUNTER
Requested Prescriptions   Pending Prescriptions Disp Refills     dextroamphetamine (DEXTROSTAT) 10 MG tablet  Last Written Prescription Date:  06/28/2019  Last Fill Quantity: 60 tablet,  # refills: 0   Last Office Visit: 1/16/2019 Christiana Harden MD   Future Office Visit:      60 tablet 0     Sig: Take 2 tablets at 4 pm       There is no refill protocol information for this order     Routing refill request to provider for review/approval because:  Drug not on the [facility] or [facility] refill protocol or controlled substance

## 2019-07-29 RX ORDER — DEXTROAMPHETAMINE SULFATE 10 MG/1
TABLET ORAL
Qty: 60 TABLET | Refills: 0 | Status: SHIPPED | OUTPATIENT
Start: 2019-07-29 | End: 2019-08-26

## 2019-07-29 NOTE — TELEPHONE ENCOUNTER
rx sent. Due for 6 month follow-up. Please let know    Jessenia Morgan MD  Internal Medicine/Pediatrics

## 2019-08-05 ENCOUNTER — MYC REFILL (OUTPATIENT)
Dept: PEDIATRICS | Facility: CLINIC | Age: 24
End: 2019-08-05

## 2019-08-05 DIAGNOSIS — F90.2 ATTENTION DEFICIT HYPERACTIVITY DISORDER (ADHD), COMBINED TYPE: ICD-10-CM

## 2019-08-05 NOTE — TELEPHONE ENCOUNTER
This is about a week early. Ok to postpone to fill later in the week.     Refill request for: ADDERALL XR 30 MG every day    Last rx written: 7/8/19 # 30    **[place]  reviewed- last filled: 7/9, 6/10, 5/14  Last OV: 1/16/19 for ADHD

## 2019-08-06 RX ORDER — DEXTROAMPHETAMINE SACCHARATE, AMPHETAMINE ASPARTATE MONOHYDRATE, DEXTROAMPHETAMINE SULFATE AND AMPHETAMINE SULFATE 7.5; 7.5; 7.5; 7.5 MG/1; MG/1; MG/1; MG/1
30 CAPSULE, EXTENDED RELEASE ORAL DAILY
Qty: 30 CAPSULE | Refills: 0 | Status: SHIPPED | OUTPATIENT
Start: 2019-08-06 | End: 2019-09-04

## 2019-08-06 NOTE — TELEPHONE ENCOUNTER
Mychart- message pt due for 6 mo follow up.     //Keyonna Herrera MA// August 6, 2019 9:33 AM

## 2019-08-06 NOTE — TELEPHONE ENCOUNTER
Script written 7/8.  Ok to fill.  Will route to a provider who can e prescribe.  Christiana Harden MD

## 2019-08-26 ENCOUNTER — MYC REFILL (OUTPATIENT)
Dept: PEDIATRICS | Facility: CLINIC | Age: 24
End: 2019-08-26

## 2019-08-26 DIAGNOSIS — F90.2 ATTENTION DEFICIT HYPERACTIVITY DISORDER (ADHD), COMBINED TYPE: ICD-10-CM

## 2019-08-26 RX ORDER — DEXTROAMPHETAMINE SULFATE 10 MG/1
TABLET ORAL
Qty: 60 TABLET | Refills: 0 | Status: SHIPPED | OUTPATIENT
Start: 2019-08-26 | End: 2019-09-23

## 2019-08-26 NOTE — TELEPHONE ENCOUNTER
dextrostat     Routing refill request to provider for review/approval because:  Drug not on the [facility], [facility] or  Health refill protocol or controlled substance    Last script in [place]  was written by PCP and was filled on 7/29/19 . Routed to PCP for review. Desiree Alvarado RN August 26, 2019 3:05 PM

## 2019-09-04 ENCOUNTER — MYC REFILL (OUTPATIENT)
Dept: PEDIATRICS | Facility: CLINIC | Age: 24
End: 2019-09-04

## 2019-09-04 DIAGNOSIS — F90.2 ATTENTION DEFICIT HYPERACTIVITY DISORDER (ADHD), COMBINED TYPE: ICD-10-CM

## 2019-09-04 RX ORDER — DEXTROAMPHETAMINE SACCHARATE, AMPHETAMINE ASPARTATE MONOHYDRATE, DEXTROAMPHETAMINE SULFATE AND AMPHETAMINE SULFATE 7.5; 7.5; 7.5; 7.5 MG/1; MG/1; MG/1; MG/1
30 CAPSULE, EXTENDED RELEASE ORAL DAILY
Qty: 30 CAPSULE | Refills: 0 | Status: SHIPPED | OUTPATIENT
Start: 2019-09-04 | End: 2019-10-03

## 2019-09-04 NOTE — TELEPHONE ENCOUNTER
adderall      Last Written Prescription Date:  8/6/19  Last Fill Quantity: 30,   # refills: 0  Last Office Visit: 1/16/19  Future Office visit:       Routing refill request to provider for review/approval because:  Drug not on the [facility], P or [facility] refill protocol or controlled substance

## 2019-09-11 ENCOUNTER — OFFICE VISIT (OUTPATIENT)
Dept: PEDIATRICS | Facility: CLINIC | Age: 24
End: 2019-09-11
Payer: COMMERCIAL

## 2019-09-11 VITALS
TEMPERATURE: 97.6 F | BODY MASS INDEX: 35.37 KG/M2 | WEIGHT: 233.4 LBS | DIASTOLIC BLOOD PRESSURE: 88 MMHG | HEART RATE: 66 BPM | SYSTOLIC BLOOD PRESSURE: 120 MMHG | HEIGHT: 68 IN | OXYGEN SATURATION: 96 %

## 2019-09-11 DIAGNOSIS — F90.2 ATTENTION DEFICIT HYPERACTIVITY DISORDER (ADHD), COMBINED TYPE: Primary | ICD-10-CM

## 2019-09-11 PROCEDURE — 99213 OFFICE O/P EST LOW 20 MIN: CPT | Performed by: INTERNAL MEDICINE

## 2019-09-11 ASSESSMENT — MIFFLIN-ST. JEOR: SCORE: 2025.26

## 2019-09-11 NOTE — PROGRESS NOTES
"Subjective     Baldomero Donato is a 24 year old male who presents to clinic today for the following health issues:    HPI   Medication Followup of Adderall     Taking Medication as prescribed: yes    Side Effects:  None    Medication Helping Symptoms:  NO       Feels that ADHD symptoms are about the same.  Is not going back to school this year.  He feels that current dosing is adequate.   Is taking the morning dose about 5:30 am and afternoon dose about 3 pm.          Patient Active Problem List   Diagnosis     Attention deficit hyperactivity disorder (ADHD)     No past surgical history on file.    Social History     Tobacco Use     Smoking status: Never Smoker     Smokeless tobacco: Current User     Types: Chew   Substance Use Topics     Alcohol use: No     Alcohol/week: 0.0 oz     Family History   Problem Relation Age of Onset     Diabetes No family hx of      C.A.D. No family hx of      Hypertension No family hx of          Current Outpatient Medications   Medication Sig Dispense Refill     amphetamine-dextroamphetamine (ADDERALL XR) 30 MG 24 hr capsule Take 1 capsule (30 mg) by mouth daily 30 capsule 0     dextroamphetamine (DEXTROSTAT) 10 MG tablet Take 2 tablets at 4 pm 60 tablet 0     Allergies   Allergen Reactions     Bees          Reviewed and updated as needed this visit by Provider         Review of Systems   ROS COMP: Constitutional, HEENT, cardiovascular, pulmonary, gi and gu systems are negative, except as otherwise noted.      Objective    /88 (BP Location: Right arm, Patient Position: Chair, Cuff Size: Adult Regular)   Pulse 66   Temp 97.6  F (36.4  C) (Oral)   Ht 1.715 m (5' 7.5\")   Wt 105.9 kg (233 lb 6.4 oz)   SpO2 96%   BMI 36.02 kg/m    There is no height or weight on file to calculate BMI.  Physical Exam   GENERAL:  alert and no distress  NECK: no adenopathy, no asymmetry, masses, or scars and thyroid normal to palpation  RESP: lungs clear to auscultation - no rales, rhonchi " "or wheezes  CV: regular rate and rhythm, normal S1 S2, no S3 or S4, no murmur, click or rub, no peripheral edema and peripheral pulses strong  ABDOMEN: soft, nontender,  and bowel sounds normal  MS: no gross musculoskeletal defects noted, no edema    Diagnostic Test Results:  none         Assessment & Plan     (F90.2) Attention deficit hyperactivity disorder (ADHD), combined type  (primary encounter diagnosis)  -pt feels dosing is adequate currently  -discussed possibility of adding a second long acting adderal at noon and stopping the short acting ritalin as he takes the adderall so early in the am; pt wishes to consider this.  He is worried about this affecting his sleep.  -no changes to dosing currently      Tobacco Cessation:   reports that he has never smoked. His smokeless tobacco use includes chew.        BMI:   Estimated body mass index is 36.02 kg/m  as calculated from the following:    Height as of this encounter: 1.715 m (5' 7.5\").    Weight as of this encounter: 105.9 kg (233 lb 6.4 oz).   Weight management plan: Discussed healthy diet and exercise guidelines        FUTURE APPOINTMENTS:       - Follow-up visit in 6 months.  Patient is aware of my leaving practice.      No follow-ups on file.    Christiana Harden MD  [facility]         "

## 2019-09-23 ENCOUNTER — MYC REFILL (OUTPATIENT)
Dept: PEDIATRICS | Facility: CLINIC | Age: 24
End: 2019-09-23

## 2019-09-23 DIAGNOSIS — F90.2 ATTENTION DEFICIT HYPERACTIVITY DISORDER (ADHD), COMBINED TYPE: ICD-10-CM

## 2019-09-24 ENCOUNTER — MYC REFILL (OUTPATIENT)
Dept: PEDIATRICS | Facility: CLINIC | Age: 24
End: 2019-09-24

## 2019-09-24 DIAGNOSIS — F90.2 ATTENTION DEFICIT HYPERACTIVITY DISORDER (ADHD), COMBINED TYPE: ICD-10-CM

## 2019-09-24 RX ORDER — DEXTROAMPHETAMINE SULFATE 10 MG/1
TABLET ORAL
Qty: 60 TABLET | Refills: 0 | Status: SHIPPED | OUTPATIENT
Start: 2019-09-24 | End: 2019-10-22

## 2019-09-24 RX ORDER — DEXTROAMPHETAMINE SULFATE 10 MG/1
TABLET ORAL
Qty: 60 TABLET | Refills: 0 | Status: CANCELLED | OUTPATIENT
Start: 2019-09-24

## 2019-09-24 NOTE — TELEPHONE ENCOUNTER
Pt is also on Adderall XR 30 mg every day(last rx was on 9/4/19 for 30#).    Dextroamphetamine 10 mg 2 tabs at 4 pm       Last Written Prescription Date:  8/26/19  Last Fill Quantity: 60,   # refills: 0  Last Office Visit: 9/11/19  Future Office visit:       Routing refill request to provider for review/approval because:  Drug not on the G, P or [facility] refill protocol or controlled substance    Dejon, RN  Triage Nurse

## 2019-10-01 ENCOUNTER — HEALTH MAINTENANCE LETTER (OUTPATIENT)
Age: 24
End: 2019-10-01

## 2019-10-02 ENCOUNTER — MYC REFILL (OUTPATIENT)
Dept: PEDIATRICS | Facility: CLINIC | Age: 24
End: 2019-10-02

## 2019-10-02 DIAGNOSIS — F90.2 ATTENTION DEFICIT HYPERACTIVITY DISORDER (ADHD), COMBINED TYPE: ICD-10-CM

## 2019-10-03 ENCOUNTER — MYC REFILL (OUTPATIENT)
Dept: PEDIATRICS | Facility: CLINIC | Age: 24
End: 2019-10-03

## 2019-10-03 DIAGNOSIS — F90.2 ATTENTION DEFICIT HYPERACTIVITY DISORDER (ADHD), COMBINED TYPE: ICD-10-CM

## 2019-10-03 RX ORDER — DEXTROAMPHETAMINE SACCHARATE, AMPHETAMINE ASPARTATE MONOHYDRATE, DEXTROAMPHETAMINE SULFATE AND AMPHETAMINE SULFATE 7.5; 7.5; 7.5; 7.5 MG/1; MG/1; MG/1; MG/1
30 CAPSULE, EXTENDED RELEASE ORAL DAILY
Qty: 30 CAPSULE | Refills: 0 | Status: CANCELLED | OUTPATIENT
Start: 2019-10-03

## 2019-10-03 NOTE — TELEPHONE ENCOUNTER
Requested Prescriptions   Pending Prescriptions Disp Refills     amphetamine-dextroamphetamine (ADDERALL XR) 30 MG 24 hr capsule  Last Written Prescription Date:  09/04/2019  Last Fill Quantity: 30 capsule,  # refills: 0   Last Office Visit: 9/11/2019 Christiana Harden MD   Future Office Visit:      30 capsule 0     Sig: Take 1 capsule (30 mg) by mouth daily       There is no refill protocol information for this order

## 2019-10-04 ENCOUNTER — MYC REFILL (OUTPATIENT)
Dept: PEDIATRICS | Facility: CLINIC | Age: 24
End: 2019-10-04

## 2019-10-04 DIAGNOSIS — F90.2 ATTENTION DEFICIT HYPERACTIVITY DISORDER (ADHD), COMBINED TYPE: ICD-10-CM

## 2019-10-04 RX ORDER — DEXTROAMPHETAMINE SACCHARATE, AMPHETAMINE ASPARTATE MONOHYDRATE, DEXTROAMPHETAMINE SULFATE AND AMPHETAMINE SULFATE 7.5; 7.5; 7.5; 7.5 MG/1; MG/1; MG/1; MG/1
30 CAPSULE, EXTENDED RELEASE ORAL DAILY
Qty: 30 CAPSULE | Refills: 0 | Status: SHIPPED | OUTPATIENT
Start: 2019-10-04 | End: 2019-10-31

## 2019-10-04 RX ORDER — DEXTROAMPHETAMINE SACCHARATE, AMPHETAMINE ASPARTATE MONOHYDRATE, DEXTROAMPHETAMINE SULFATE AND AMPHETAMINE SULFATE 7.5; 7.5; 7.5; 7.5 MG/1; MG/1; MG/1; MG/1
30 CAPSULE, EXTENDED RELEASE ORAL DAILY
Qty: 30 CAPSULE | Refills: 0 | OUTPATIENT
Start: 2019-10-04

## 2019-10-04 NOTE — TELEPHONE ENCOUNTER
Reviewed . Last filled 9/5. No concerns. rx e-prescribed.    Jessenia Morgan MD  Internal Medicine/Pediatrics

## 2019-10-04 NOTE — TELEPHONE ENCOUNTER
Adderall XR      Last Written Prescription Date:  9/4/19  Last Fill Quantity: 30,   # refills: 0  Last Office Visit: 9/11/19  Future Office visit:       Routing refill request to provider for review/approval because:  Drug not on the G, P or [facility] refill protocol or controlled substance    Dejon, RN  Triage Nurse

## 2019-10-04 NOTE — TELEPHONE ENCOUNTER
Duplicate request. rx sent on different encounter    Jessenia Morgan MD  Internal Medicine/Pediatrics

## 2019-10-22 ENCOUNTER — MYC REFILL (OUTPATIENT)
Dept: PEDIATRICS | Facility: CLINIC | Age: 24
End: 2019-10-22

## 2019-10-22 DIAGNOSIS — F90.2 ATTENTION DEFICIT HYPERACTIVITY DISORDER (ADHD), COMBINED TYPE: ICD-10-CM

## 2019-10-22 RX ORDER — DEXTROAMPHETAMINE SULFATE 10 MG/1
TABLET ORAL
Qty: 60 TABLET | Refills: 0 | Status: SHIPPED | OUTPATIENT
Start: 2019-10-22 | End: 2019-10-31

## 2019-10-22 NOTE — TELEPHONE ENCOUNTER
Last Written Prescription Date:  09/24/19  Last Fill Quantity: 60,  # refills: 0   Last office visit: 9/11/2019 with prescribing provider:     Future Office Visit:    Requested Prescriptions   Pending Prescriptions Disp Refills     dextroamphetamine (DEXTROSTAT) 10 MG tablet 60 tablet 0     Sig: Take 2 tablets at 4 pm       There is no refill protocol information for this order

## 2019-10-31 ENCOUNTER — MYC REFILL (OUTPATIENT)
Dept: PEDIATRICS | Facility: CLINIC | Age: 24
End: 2019-10-31

## 2019-10-31 DIAGNOSIS — F90.2 ATTENTION DEFICIT HYPERACTIVITY DISORDER (ADHD), COMBINED TYPE: ICD-10-CM

## 2019-10-31 NOTE — TELEPHONE ENCOUNTER
Requested Prescriptions   Pending Prescriptions Disp Refills     dextroamphetamine (DEXTROSTAT) 10 MG tablet        Last Written Prescription Date:  10/22/2019  Last Fill Quantity: 60,   # refills: 0  Last Office Visit: 9/11/2019  Future Office visit:       Routing refill request to provider for review/approval because:  Drug not on the [facility], P or [facility] refill protocol or controlled substance   60 tablet 0     Sig: Take 2 tablets at 4 pm       There is no refill protocol information for this order

## 2019-11-01 RX ORDER — DEXTROAMPHETAMINE SACCHARATE, AMPHETAMINE ASPARTATE MONOHYDRATE, DEXTROAMPHETAMINE SULFATE AND AMPHETAMINE SULFATE 7.5; 7.5; 7.5; 7.5 MG/1; MG/1; MG/1; MG/1
30 CAPSULE, EXTENDED RELEASE ORAL DAILY
Qty: 30 CAPSULE | Refills: 0 | Status: SHIPPED | OUTPATIENT
Start: 2019-11-01 | End: 2019-11-29

## 2019-11-01 RX ORDER — DEXTROAMPHETAMINE SULFATE 10 MG/1
TABLET ORAL
Qty: 60 TABLET | Refills: 0 | Status: SHIPPED | OUTPATIENT
Start: 2019-11-01 | End: 2019-11-18

## 2019-11-01 NOTE — TELEPHONE ENCOUNTER
Refill Request:  Adderall XR 30 mg 24 hr tablet    Last Written Prescription Date:  10/4/19  Last Fill Quantity: 30 capsules,  # refills: 0   Last office visit: 9/11/2019 with prescribing provider:  Dr. Harden   Future Office Visit:    None currently scheduled.      checked.  Medication last written: 10/4/19, 30 capsules  Medication last filled: 10/4/19, 30 capsules    Routing to MD to review.

## 2019-11-01 NOTE — TELEPHONE ENCOUNTER
Refill Request:  Dextrostat 10 mg tablet    Last Written Prescription Date:  10/22/19  Last Fill Quantity: 60 tablets,  # refills: 0   Last office visit: 9/11/2019 with prescribing provider:  Dr. Harden   Future Office Visit:    None currently scheduled.     checked.  Medication last written: 10/22/19, 60 tablets  Medication last filled: 10/23/19,   Routing to the MD to please review.

## 2019-11-18 ENCOUNTER — MYC REFILL (OUTPATIENT)
Dept: PEDIATRICS | Facility: CLINIC | Age: 24
End: 2019-11-18

## 2019-11-18 DIAGNOSIS — F90.2 ATTENTION DEFICIT HYPERACTIVITY DISORDER (ADHD), COMBINED TYPE: ICD-10-CM

## 2019-11-18 RX ORDER — DEXTROAMPHETAMINE SULFATE 10 MG/1
TABLET ORAL
Qty: 60 TABLET | Refills: 0 | Status: CANCELLED | OUTPATIENT
Start: 2019-11-18

## 2019-11-18 NOTE — TELEPHONE ENCOUNTER
Duplicate refill request.    See other refill encounter.    Pako Husain RN on 11/18/2019 at 9:19 AM

## 2019-11-18 NOTE — TELEPHONE ENCOUNTER
Refill Request:  Dextrostat 10 mg tablet    Last Written Prescription Date:  11/1/19  Last Fill Quantity: 60 tablet,  # refills: 0   Last office visit: 9/11/2019 with prescribing provider:  Dr. Rodas   Future Office Visit:    None currently scheduled.     checked.  Medication last written: 10/22/19, 60 tablets  Medication last filled: 10/23/19,  MD please review.     Routing to Dr. Rodas to advise refill.

## 2019-11-18 NOTE — TELEPHONE ENCOUNTER
Requested Prescriptions   Pending Prescriptions Disp Refills     dextroamphetamine (DEXTROSTAT) 10 MG tablet 60 tablet 0     Sig: Take 2 tablets at 4 pm        Last Written Prescription Date:  11/1/19  Last Fill Quantity: 60 tab,   # refills: 0  Last Office Visit: 9/11/19 Dereck  Future Office visit:       Routing refill request to provider for review/approval because:  Drug not on the [facility], [facility] or [facility] refill protocol or controlled substance         There is no refill protocol information for this order

## 2019-11-21 NOTE — TELEPHONE ENCOUNTER
This patient is a patient of Dr. Harden, who is being covered by, I believe Team A or B.  Her refill requests should be forwarded to that team, as I have not seen this patient.

## 2019-11-21 NOTE — TELEPHONE ENCOUNTER
Refill Request:  Dextrostat 10 mg tablet    Last Written Prescription Date:  11/1/19  Last Fill Quantity: 60 tablets,  # refills: 0   Last office visit: 9/11/2019 with prescribing provider:  Dr. Rodas   Future Office Visit:    None currently scheduled.     checked.  Medication last written: 11/1/19. 60 tablets.   Medication last filled: 11/19/19.    MD please review.     Routing to Dr. Rodas to advise on refill.

## 2019-11-22 RX ORDER — DEXTROAMPHETAMINE SULFATE 10 MG/1
TABLET ORAL
Qty: 60 TABLET | Refills: 0 | Status: SHIPPED | OUTPATIENT
Start: 2019-11-22 | End: 2020-01-09

## 2019-11-22 NOTE — TELEPHONE ENCOUNTER
Filled x 1 month  Needs to establish with new provider in 4 months  He can schedule with any provider right away. That provider or station A can fill the meds until his f/u appt.

## 2019-11-27 ENCOUNTER — MYC REFILL (OUTPATIENT)
Dept: PEDIATRICS | Facility: CLINIC | Age: 24
End: 2019-11-27

## 2019-11-27 ENCOUNTER — MYC MEDICAL ADVICE (OUTPATIENT)
Dept: PEDIATRICS | Facility: CLINIC | Age: 24
End: 2019-11-27

## 2019-11-27 DIAGNOSIS — F90.2 ATTENTION DEFICIT HYPERACTIVITY DISORDER (ADHD), COMBINED TYPE: ICD-10-CM

## 2019-11-27 RX ORDER — DEXTROAMPHETAMINE SACCHARATE, AMPHETAMINE ASPARTATE MONOHYDRATE, DEXTROAMPHETAMINE SULFATE AND AMPHETAMINE SULFATE 7.5; 7.5; 7.5; 7.5 MG/1; MG/1; MG/1; MG/1
30 CAPSULE, EXTENDED RELEASE ORAL DAILY
Qty: 30 CAPSULE | Refills: 0 | Status: CANCELLED | OUTPATIENT
Start: 2019-11-27

## 2019-11-27 RX ORDER — DEXTROAMPHETAMINE SULFATE, DEXTROAMPHETAMINE SACCHARATE, AMPHETAMINE SULFATE AND AMPHETAMINE ASPARTATE 7.5; 7.5; 7.5; 7.5 MG/1; MG/1; MG/1; MG/1
CAPSULE, EXTENDED RELEASE ORAL
Qty: 30 CAPSULE | Refills: 0 | OUTPATIENT
Start: 2019-11-27

## 2019-11-29 RX ORDER — DEXTROAMPHETAMINE SACCHARATE, AMPHETAMINE ASPARTATE MONOHYDRATE, DEXTROAMPHETAMINE SULFATE AND AMPHETAMINE SULFATE 7.5; 7.5; 7.5; 7.5 MG/1; MG/1; MG/1; MG/1
30 CAPSULE, EXTENDED RELEASE ORAL DAILY
Qty: 30 CAPSULE | Refills: 0 | Status: SHIPPED | OUTPATIENT
Start: 2019-11-29 | End: 2019-12-26

## 2019-11-29 NOTE — TELEPHONE ENCOUNTER
Ds to schedule an office visit to establish with new provider. Appointment needs to be made prior to next refill

## 2019-11-29 NOTE — TELEPHONE ENCOUNTER
Last Written Prescription Date:  11-1-19  Last Fill Quantity: 30,  # refills: 0   Last office visit: 9/11/2019 with prescribing provider:  9-11-19   Future Office Visit:  None scheduled    Routing refill request to provider for review/approval because:  Drug not on the [facility], [facility] or [facility] refill protocol or controlled substance  Meagan Wallis RN

## 2019-11-29 NOTE — TELEPHONE ENCOUNTER
Patient has appointment scheduled for 1/21/2020 for a medication follow for his Adderall.   Can we fill his prescription up to appointment date?     Nadya Butts MA

## 2019-12-01 RX ORDER — DEXTROAMPHETAMINE SULFATE, DEXTROAMPHETAMINE SACCHARATE, AMPHETAMINE SULFATE AND AMPHETAMINE ASPARTATE 7.5; 7.5; 7.5; 7.5 MG/1; MG/1; MG/1; MG/1
CAPSULE, EXTENDED RELEASE ORAL
Qty: 30 CAPSULE | Refills: 0 | OUTPATIENT
Start: 2019-12-01

## 2019-12-02 NOTE — TELEPHONE ENCOUNTER
Already refilled. Duplicate.    Adderall XR 30 mg      Last Written Prescription Date:  11/29/19  Last Fill Quantity: 30,   # refills: 0  Last Office Visit: 9/11/19  Future Office visit:    Next 5 appointments (look out 90 days)    Jan 21, 2020  8:30 AM CST  (Arrive by 8:10 AM)  Office Visit with Allen Rodas MD  [facility] [address] [place] [id number] [phone]           Routing refill request to provider for review/approval because:  Drug not on the [facility], [facility] or  Health refill protocol or controlled substance    Dejon, RN  Triage Nurse

## 2019-12-02 NOTE — TELEPHONE ENCOUNTER
Duplicate.    Adderall XR 30 mg      Last Written Prescription Date:  11/29/19  Last Fill Quantity: 30,   # refills: 0  Last Office Visit: 9/11/19  Future Office visit:    Next 5 appointments (look out 90 days)    Jan 21, 2020  8:30 AM CST  (Arrive by 8:10 AM)  Office Visit with Allen Rodas MD  [facility] ([facility] [address] [place] [id number] [phone]           Routing refill request to provider for review/approval because:  Drug not on the [facility], [facility] or [facility] refill protocol or controlled substance    Dejon, RN  Triage Nurse

## 2019-12-26 ENCOUNTER — MYC REFILL (OUTPATIENT)
Dept: PEDIATRICS | Facility: CLINIC | Age: 24
End: 2019-12-26

## 2019-12-26 DIAGNOSIS — F90.2 ATTENTION DEFICIT HYPERACTIVITY DISORDER (ADHD), COMBINED TYPE: ICD-10-CM

## 2019-12-26 RX ORDER — DEXTROAMPHETAMINE SACCHARATE, AMPHETAMINE ASPARTATE MONOHYDRATE, DEXTROAMPHETAMINE SULFATE AND AMPHETAMINE SULFATE 7.5; 7.5; 7.5; 7.5 MG/1; MG/1; MG/1; MG/1
30 CAPSULE, EXTENDED RELEASE ORAL DAILY
Qty: 30 CAPSULE | Refills: 0 | Status: CANCELLED | OUTPATIENT
Start: 2019-12-26

## 2020-01-09 DIAGNOSIS — F90.2 ATTENTION DEFICIT HYPERACTIVITY DISORDER (ADHD), COMBINED TYPE: ICD-10-CM

## 2020-01-09 RX ORDER — DEXTROAMPHETAMINE SULFATE 10 MG/1
TABLET ORAL
Qty: 60 TABLET | Refills: 0 | Status: SHIPPED | OUTPATIENT
Start: 2020-01-09 | End: 2020-01-21

## 2020-01-09 NOTE — TELEPHONE ENCOUNTER
dextroamphetamine (DEXTROSTAT) 10 MG tablet        Last written prescription date: 11/22/19        Last fill quantity: 60, # refills: 0        Last office visit: 09/11/19        Future office visit: 1/21/20        Is this a controlled substance?  Yes    Clinic visit frequence required:  Q 6  months     Controlled substance agreement on file: No.  Documentation in problem list reviewed:  Yes     Processing:  Rx to be electronically transmitted to pharmacy by provider        RX monitoring program ([place]) reviewed: Recommend provider review     [place]:  [email or web address]       Routing refill request to provider for review/approval because: Drug not on the [facility], [facility] or  Health refill protocol or controlled substance  Savannah GONZALEZ RN, BSN

## 2020-01-20 ENCOUNTER — PRE VISIT (OUTPATIENT)
Dept: PEDIATRICS | Facility: CLINIC | Age: 25
End: 2020-01-20

## 2020-01-20 NOTE — TELEPHONE ENCOUNTER
"Pre-Visit Planning     Future Appointments   Date Time Provider Department Center   1/21/2020  8:30 AM Allen Rodas MD [facility]     Arrival Time for this Appointment:    Appointment Notes for this encounter:   Data Unavailable    Questionnaires Reviewed/Assigned  No additional questionnaires are needed       Patient preferred phone number: [phone]    Spoke to patient via phone. Patient does not have additional questions or concerns.        Visit is not preventive.    Health Maintenance Due   Topic Date Due     ANNUAL REVIEW OF HM ORDERS  1997     HIV SCREENING  03/12/2012     PREVENTIVE CARE VISIT  10/31/2015     INFLUENZA VACCINE (1) 09/01/2019     PHQ-2  01/01/2020     Patient is due for:  No appointment needed.  Ram Powert  Patient is active on [employer].    Questionnaire Review   Advised patient to arrive early in order to complete questionnaires.    Call Summary  \"Thank you for your time today.  If anything comes up before your appointment, please feel free to contact us at [phone].\"  "

## 2020-01-21 ENCOUNTER — OFFICE VISIT (OUTPATIENT)
Dept: PEDIATRICS | Facility: CLINIC | Age: 25
End: 2020-01-21
Payer: COMMERCIAL

## 2020-01-21 VITALS
TEMPERATURE: 98 F | SYSTOLIC BLOOD PRESSURE: 124 MMHG | HEART RATE: 82 BPM | WEIGHT: 242.8 LBS | BODY MASS INDEX: 36.8 KG/M2 | HEIGHT: 68 IN | DIASTOLIC BLOOD PRESSURE: 82 MMHG | OXYGEN SATURATION: 96 %

## 2020-01-21 DIAGNOSIS — F90.2 ATTENTION DEFICIT HYPERACTIVITY DISORDER (ADHD), COMBINED TYPE: ICD-10-CM

## 2020-01-21 PROCEDURE — 99214 OFFICE O/P EST MOD 30 MIN: CPT | Performed by: INTERNAL MEDICINE

## 2020-01-21 RX ORDER — DEXTROAMPHETAMINE SULFATE 10 MG/1
TABLET ORAL
Qty: 60 TABLET | Refills: 0 | Status: SHIPPED | OUTPATIENT
Start: 2020-02-09

## 2020-01-21 RX ORDER — DEXTROAMPHETAMINE SACCHARATE, AMPHETAMINE ASPARTATE MONOHYDRATE, DEXTROAMPHETAMINE SULFATE AND AMPHETAMINE SULFATE 7.5; 7.5; 7.5; 7.5 MG/1; MG/1; MG/1; MG/1
30 CAPSULE, EXTENDED RELEASE ORAL EVERY MORNING
Qty: 30 CAPSULE | Refills: 0 | Status: SHIPPED | OUTPATIENT
Start: 2020-01-25 | End: 2020-04-22

## 2020-01-21 RX ORDER — DEXTROAMPHETAMINE SULFATE 10 MG/1
TABLET ORAL
Qty: 60 TABLET | Refills: 0 | Status: SHIPPED | OUTPATIENT
Start: 2020-04-09 | End: 2020-05-06

## 2020-01-21 RX ORDER — DEXTROAMPHETAMINE SACCHARATE, AMPHETAMINE ASPARTATE MONOHYDRATE, DEXTROAMPHETAMINE SULFATE AND AMPHETAMINE SULFATE 7.5; 7.5; 7.5; 7.5 MG/1; MG/1; MG/1; MG/1
30 CAPSULE, EXTENDED RELEASE ORAL EVERY MORNING
Qty: 30 CAPSULE | Refills: 0 | Status: SHIPPED | OUTPATIENT
Start: 2020-02-25

## 2020-01-21 RX ORDER — DEXTROAMPHETAMINE SULFATE 10 MG/1
TABLET ORAL
Qty: 60 TABLET | Refills: 0 | Status: SHIPPED | OUTPATIENT
Start: 2020-03-10

## 2020-01-21 RX ORDER — DEXTROAMPHETAMINE SACCHARATE, AMPHETAMINE ASPARTATE MONOHYDRATE, DEXTROAMPHETAMINE SULFATE AND AMPHETAMINE SULFATE 7.5; 7.5; 7.5; 7.5 MG/1; MG/1; MG/1; MG/1
30 CAPSULE, EXTENDED RELEASE ORAL EVERY MORNING
Qty: 30 CAPSULE | Refills: 0 | Status: SHIPPED | OUTPATIENT
Start: 2020-03-24

## 2020-01-21 ASSESSMENT — MIFFLIN-ST. JEOR: SCORE: 2067.89

## 2020-01-21 NOTE — PROGRESS NOTES
Subjective     Baldomero Donato is a 24 year old male who presents to clinic today for the following health issues:    HPI     Medication Followup     Taking Medication as prescribed: yes    Side Effects:  None    Medication Helping Symptoms:  yes     No further concerns.    Takes 30 Adderall XR in AM, and dextroamphetamine at 1 or 2 PM.  Had been considering taking a longer acting afternoon dose.  Goes to beg 10 PM.  Wakes up at 5 to 7 AM.     Works as a .  Going to school parttime.  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     Has gained some weight this winter; not as active.       Has a 1 yo. Takes son swimming.  Mood is good.         Patient Active Problem List   Diagnosis     Attention deficit hyperactivity disorder (ADHD)     History reviewed. No pertinent surgical history.    Social History     Tobacco Use     Smoking status: Never Smoker     Smokeless tobacco: Current User     Types: Chew   Substance Use Topics     Alcohol use: No     Alcohol/week: 0.0 standard drinks     Family History   Problem Relation Age of Onset     Diabetes No family hx of      C.A.D. No family hx of      Hypertension No family hx of          Current Outpatient Medications   Medication Sig Dispense Refill     [START ON 1/25/2020] amphetamine-dextroamphetamine (ADDERALL XR) 30 MG 24 hr capsule Take 1 capsule (30 mg) by mouth every morning 30 capsule 0     [START ON 2/25/2020] amphetamine-dextroamphetamine (ADDERALL XR) 30 MG 24 hr capsule Take 1 capsule (30 mg) by mouth every morning 30 capsule 0     [START ON 3/24/2020] amphetamine-dextroamphetamine (ADDERALL XR) 30 MG 24 hr capsule Take 1 capsule (30 mg) by mouth every morning 30 capsule 0     [START ON 2/9/2020] dextroamphetamine (DEXTROSTAT) 10 MG tablet Take 2 tablets by mouth once daily at 4pm. 60 tablet 0     [START ON 3/10/2020] dextroamphetamine (DEXTROSTAT) 10 MG tablet Take 2 tablets by mouth once daily at 4pm. 60 tablet 0     [START ON 4/9/2020]  "dextroamphetamine (DEXTROSTAT) 10 MG tablet Take 2 tablets by mouth once daily at 4pm. 60 tablet 0     Allergies   Allergen Reactions     Bees      BP Readings from Last 3 Encounters:   01/21/20 124/82   09/11/19 120/88   01/16/19 120/78    Wt Readings from Last 3 Encounters:   01/21/20 110.1 kg (242 lb 12.8 oz)   09/11/19 105.9 kg (233 lb 6.4 oz)   01/16/19 102.6 kg (226 lb 3.2 oz)                  Reviewed and updated as needed this visit by Provider         Review of Systems   ROS COMP: CONSTITUTIONAL: NEGATIVE for fever, chills, change in weight  INTEGUMENTARY/SKIN: NEGATIVE for worrisome rashes, moles or lesions  EYES: NEGATIVE for vision changes or irritation  ENT/MOUTH: NEGATIVE for ear, mouth and throat problems  RESP: NEGATIVE for significant cough or SOB  BREAST: NEGATIVE for masses, tenderness or discharge  CV: NEGATIVE for chest pain, palpitations or peripheral edema  GI: NEGATIVE for nausea, abdominal pain, heartburn, or change in bowel habits  : NEGATIVE for frequency, dysuria, or hematuria  MUSCULOSKELETAL: NEGATIVE for significant arthralgias or myalgia  NEURO: NEGATIVE for weakness, dizziness or paresthesias  ENDOCRINE: NEGATIVE for temperature intolerance, skin/hair changes  HEME: NEGATIVE for bleeding problems  PSYCHIATRIC: NEGATIVE for changes in mood or affect      Objective    /82 (BP Location: Right arm, Patient Position: Sitting, Cuff Size: Adult Large)   Pulse 82   Temp 98  F (36.7  C) (Tympanic)   Ht 1.715 m (5' 7.5\")   Wt 110.1 kg (242 lb 12.8 oz)   SpO2 96%   BMI 37.47 kg/m    Body mass index is 37.47 kg/m .  Physical Exam   GENERAL: healthy, alert and no distress  EYES: Eyes grossly normal to inspection, PERRL and conjunctivae and sclerae normal  HENT: ear canals and TM's normal, nose and mouth without ulcers or lesions  NECK: no adenopathy, no asymmetry, masses, or scars and thyroid normal to palpation  RESP: lungs clear to auscultation - no rales, rhonchi or wheezes  CV: " regular rate and rhythm, normal S1 S2, no S3 or S4, no murmur, click or rub, no peripheral edema and peripheral pulses strong  ABDOMEN: soft, nontender, no hepatosplenomegaly, no masses and bowel sounds normal  MS: no gross musculoskeletal defects noted, no edema  SKIN: no suspicious lesions or rashes  NEURO: Normal strength and tone, mentation intact and speech normal  PSYCH: mentation appears normal, affect normal/bright    Diagnostic Test Results:  Labs reviewed in Epic        Assessment & Plan     1. Attention deficit hyperactivity disorder (ADHD), combined type  Overall doing well.   Given 3 months of refills today for ADHD meds.  In 3 months, call or email us for another 3 months of refills, but will need an appointment in another 6 months.      - Drug Abuse Screen Panel 13, Urine (Pain Care Package)  - amphetamine-dextroamphetamine (ADDERALL XR) 30 MG 24 hr capsule; Take 1 capsule (30 mg) by mouth every morning  Dispense: 30 capsule; Refill: 0  - amphetamine-dextroamphetamine (ADDERALL XR) 30 MG 24 hr capsule; Take 1 capsule (30 mg) by mouth every morning  Dispense: 30 capsule; Refill: 0  - amphetamine-dextroamphetamine (ADDERALL XR) 30 MG 24 hr capsule; Take 1 capsule (30 mg) by mouth every morning  Dispense: 30 capsule; Refill: 0  - dextroamphetamine (DEXTROSTAT) 10 MG tablet; Take 2 tablets by mouth once daily at 4pm.  Dispense: 60 tablet; Refill: 0  - dextroamphetamine (DEXTROSTAT) 10 MG tablet; Take 2 tablets by mouth once daily at 4pm.  Dispense: 60 tablet; Refill: 0  - dextroamphetamine (DEXTROSTAT) 10 MG tablet; Take 2 tablets by mouth once daily at 4pm.  Dispense: 60 tablet; Refill: 0     Tobacco Cessation:   reports that he has never smoked. His smokeless tobacco use includes chew.          Patient Instructions   No change in current dosing.    Lab work downstairs today.  Directions:  As you walk through the first floor, you'll see (on the right) first the pharmacy, then some bathrooms, then the  "\"Lab and Imaging\" area. Give them your name at the window there and wait for them to call you.     I refilled meds each for 3 months; in 3 months, call us for an additional 3 months of prescriptions.    Follow up with me in 6 months.    Allen Rodas MD  Internal Medicine and Pediatrics         No follow-ups on file.    Allen Rodas MD  [facility]      "

## 2020-01-21 NOTE — PATIENT INSTRUCTIONS
"No change in current dosing.    Lab work downstairs today.  Directions:  As you walk through the first floor, you'll see (on the right) first the pharmacy, then some bathrooms, then the \"Lab and Imaging\" area. Give them your name at the window there and wait for them to call you.     I refilled meds each for 3 months; in 3 months, call us for an additional 3 months of prescriptions.    Follow up with me in 6 months.    Allen Rodas MD  Internal Medicine and Pediatrics     "

## 2020-04-22 ENCOUNTER — MYC REFILL (OUTPATIENT)
Dept: PEDIATRICS | Facility: CLINIC | Age: 25
End: 2020-04-22

## 2020-04-22 DIAGNOSIS — F90.2 ATTENTION DEFICIT HYPERACTIVITY DISORDER (ADHD), COMBINED TYPE: ICD-10-CM

## 2020-04-22 RX ORDER — DEXTROAMPHETAMINE SACCHARATE, AMPHETAMINE ASPARTATE MONOHYDRATE, DEXTROAMPHETAMINE SULFATE AND AMPHETAMINE SULFATE 7.5; 7.5; 7.5; 7.5 MG/1; MG/1; MG/1; MG/1
30 CAPSULE, EXTENDED RELEASE ORAL EVERY MORNING
Qty: 30 CAPSULE | Refills: 0 | Status: SHIPPED | OUTPATIENT
Start: 2020-04-22

## 2020-04-22 RX ORDER — DEXTROAMPHETAMINE SACCHARATE, AMPHETAMINE ASPARTATE MONOHYDRATE, DEXTROAMPHETAMINE SULFATE AND AMPHETAMINE SULFATE 7.5; 7.5; 7.5; 7.5 MG/1; MG/1; MG/1; MG/1
30 CAPSULE, EXTENDED RELEASE ORAL EVERY MORNING
Qty: 30 CAPSULE | Refills: 0 | Status: SHIPPED | OUTPATIENT
Start: 2020-06-21 | End: 2020-07-17

## 2020-04-22 RX ORDER — DEXTROAMPHETAMINE SACCHARATE, AMPHETAMINE ASPARTATE MONOHYDRATE, DEXTROAMPHETAMINE SULFATE AND AMPHETAMINE SULFATE 7.5; 7.5; 7.5; 7.5 MG/1; MG/1; MG/1; MG/1
30 CAPSULE, EXTENDED RELEASE ORAL EVERY MORNING
Qty: 30 CAPSULE | Refills: 0 | Status: SHIPPED | OUTPATIENT
Start: 2020-05-22

## 2020-05-06 ENCOUNTER — MYC REFILL (OUTPATIENT)
Dept: PEDIATRICS | Facility: CLINIC | Age: 25
End: 2020-05-06

## 2020-05-06 DIAGNOSIS — F90.2 ATTENTION DEFICIT HYPERACTIVITY DISORDER (ADHD), COMBINED TYPE: ICD-10-CM

## 2020-05-06 RX ORDER — DEXTROAMPHETAMINE SULFATE 10 MG/1
TABLET ORAL
Qty: 60 TABLET | Refills: 0 | Status: SHIPPED | OUTPATIENT
Start: 2020-05-06 | End: 2020-06-04

## 2020-05-07 RX ORDER — DEXTROAMPHETAMINE SULFATE 10 MG/1
TABLET ORAL
Qty: 60 TABLET | Refills: 0 | OUTPATIENT
Start: 2020-05-07

## 2020-06-04 ENCOUNTER — MYC REFILL (OUTPATIENT)
Dept: PEDIATRICS | Facility: CLINIC | Age: 25
End: 2020-06-04

## 2020-06-04 DIAGNOSIS — F90.2 ATTENTION DEFICIT HYPERACTIVITY DISORDER (ADHD), COMBINED TYPE: ICD-10-CM

## 2020-06-04 RX ORDER — DEXTROAMPHETAMINE SULFATE 10 MG/1
20 TABLET ORAL DAILY
Qty: 60 TABLET | Refills: 0 | Status: SHIPPED | OUTPATIENT
Start: 2020-06-04 | End: 2020-07-01

## 2020-06-04 NOTE — TELEPHONE ENCOUNTER
Routing refill request to provider for review/approval because:  Drug not on the [facility] refill protocol     Dee Manzano RN   [facility] -- Triage Nurse

## 2020-07-17 DIAGNOSIS — F90.2 ATTENTION DEFICIT HYPERACTIVITY DISORDER (ADHD), COMBINED TYPE: ICD-10-CM

## 2020-07-17 RX ORDER — DEXTROAMPHETAMINE SACCHARATE, AMPHETAMINE ASPARTATE MONOHYDRATE, DEXTROAMPHETAMINE SULFATE AND AMPHETAMINE SULFATE 7.5; 7.5; 7.5; 7.5 MG/1; MG/1; MG/1; MG/1
30 CAPSULE, EXTENDED RELEASE ORAL EVERY MORNING
Qty: 30 CAPSULE | Refills: 0 | Status: SHIPPED | OUTPATIENT
Start: 2020-07-17

## 2020-07-17 NOTE — TELEPHONE ENCOUNTER
Routing refill request to provider for review/approval because:  Drug not on the [facility] refill protocol     Dee Manzano RN   [facility] -- Triage Nurse

## 2020-08-27 DIAGNOSIS — F90.2 ATTENTION DEFICIT HYPERACTIVITY DISORDER (ADHD), COMBINED TYPE: ICD-10-CM

## 2020-08-27 RX ORDER — DEXTROAMPHETAMINE SULFATE 10 MG/1
20 TABLET ORAL DAILY
Qty: 60 TABLET | Refills: 0 | Status: SHIPPED | OUTPATIENT
Start: 2020-08-27

## 2020-08-27 NOTE — TELEPHONE ENCOUNTER
Routing refill request to provider for review/approval because:  Drug not on the [facility] refill protocol     Francesca Casitllo RN

## 2020-09-01 NOTE — TELEPHONE ENCOUNTER
Left message for patient to return call.    It does look like patient was seen at outside clinic ([facility] in [place] and received refills of this medication on 08/05/2020).    KAY VALDOVINOS MA on 9/1/2020 at 10:38 AM

## 2021-01-15 ENCOUNTER — HEALTH MAINTENANCE LETTER (OUTPATIENT)
Age: 26
End: 2021-01-15

## 2021-08-12 NOTE — TELEPHONE ENCOUNTER
[place] ([place]) reviewed:  reviewed- no concerns adderall 30 mg last filled 6/10/2019 with pharmacy for 30 day supply    Jessenia RIBERA RN   [facility] [place]        
Requested Prescriptions   Pending Prescriptions Disp Refills     amphetamine-dextroamphetamine (ADDERALL XR) 30 MG 24 hr capsule        Last Written Prescription Date:  5/31/2019  Last Fill Quantity: 30,   # refills: 0  Last Office Visit: 1/16/2019  Future Office visit:       Routing refill request to provider for review/approval because:  Drug not on the [facility] or [facility] refill protocol or controlled substance   30 capsule 0     Sig: Take 1 capsule (30 mg) by mouth daily       There is no refill protocol information for this order          
185.42

## 2021-09-04 ENCOUNTER — HEALTH MAINTENANCE LETTER (OUTPATIENT)
Age: 26
End: 2021-09-04

## 2021-11-28 ENCOUNTER — MYC REFILL (OUTPATIENT)
Dept: PEDIATRICS | Facility: CLINIC | Age: 26
End: 2021-11-28
Payer: COMMERCIAL

## 2021-11-28 DIAGNOSIS — F90.2 ATTENTION DEFICIT HYPERACTIVITY DISORDER (ADHD), COMBINED TYPE: ICD-10-CM

## 2021-11-29 RX ORDER — DEXTROAMPHETAMINE SULFATE 10 MG/1
20 TABLET ORAL DAILY
Qty: 60 TABLET | Refills: 0 | OUTPATIENT
Start: 2021-11-29

## 2021-11-29 NOTE — TELEPHONE ENCOUNTER
Patient needs appointment with me, or needs to come from his current primary care provider< which looks like Dr. Harden.    Allen Rodas MD  Internal Medicine and Pediatrics

## 2021-12-03 NOTE — TELEPHONE ENCOUNTER
Spoke with pt and will follow up with his pcp who is dr cote closing encounter    Claudia Hopkins MA 3:51 PM 12/3/2021

## 2022-02-19 ENCOUNTER — HEALTH MAINTENANCE LETTER (OUTPATIENT)
Age: 27
End: 2022-02-19

## 2022-10-16 ENCOUNTER — HEALTH MAINTENANCE LETTER (OUTPATIENT)
Age: 27
End: 2022-10-16

## 2023-04-01 ENCOUNTER — HEALTH MAINTENANCE LETTER (OUTPATIENT)
Age: 28
End: 2023-04-01

## 2023-09-07 NOTE — TELEPHONE ENCOUNTER
I need pharmacy to e prescribe.  Please call patient.   Christiana Harden MD     Intermediate Repair And Flap Additional Text (Will Appearing After The Standard Complex Repair Text): The intermediate repair was not sufficient to completely close the primary defect. The remaining additional defect was repaired with the flap mentioned below.

## 2024-06-01 ENCOUNTER — HEALTH MAINTENANCE LETTER (OUTPATIENT)
Age: 29
End: 2024-06-01